# Patient Record
Sex: MALE | Race: WHITE | Employment: OTHER | URBAN - METROPOLITAN AREA
[De-identification: names, ages, dates, MRNs, and addresses within clinical notes are randomized per-mention and may not be internally consistent; named-entity substitution may affect disease eponyms.]

---

## 2017-05-15 ENCOUNTER — OFFICE VISIT (OUTPATIENT)
Dept: CARDIOLOGY CLINIC | Age: 77
End: 2017-05-15

## 2017-05-15 VITALS
SYSTOLIC BLOOD PRESSURE: 130 MMHG | HEART RATE: 52 BPM | BODY MASS INDEX: 26.07 KG/M2 | DIASTOLIC BLOOD PRESSURE: 60 MMHG | OXYGEN SATURATION: 98 % | HEIGHT: 68 IN | WEIGHT: 172 LBS

## 2017-05-15 DIAGNOSIS — I25.10 ATHEROSCLEROSIS OF NATIVE CORONARY ARTERY OF NATIVE HEART WITHOUT ANGINA PECTORIS: Primary | ICD-10-CM

## 2017-05-15 DIAGNOSIS — I11.9 BENIGN HYPERTENSIVE HEART DISEASE WITHOUT HEART FAILURE: ICD-10-CM

## 2017-05-15 DIAGNOSIS — I44.7 LBBB (LEFT BUNDLE BRANCH BLOCK): ICD-10-CM

## 2017-05-15 DIAGNOSIS — E78.5 DYSLIPIDEMIA: ICD-10-CM

## 2017-05-15 DIAGNOSIS — R07.9 CHEST PAIN, UNSPECIFIED TYPE: ICD-10-CM

## 2017-05-15 DIAGNOSIS — R00.1 SINUS BRADYCARDIA, CHRONIC: ICD-10-CM

## 2017-05-15 NOTE — MR AVS SNAPSHOT
Visit Information Date & Time Provider Department Dept. Phone Encounter #  
 5/15/2017  3:00 PM Deidra Anderson MD [facility] [address] [id number] Upcoming Health Maintenance Date Due DTaP/Tdap/Td series (1 - Tdap) 12/28/1963 FOBT Q 1 YEAR AGE 50-75 12/28/1992 ZOSTER VACCINE AGE 60> 12/28/2002 GLAUCOMA SCREENING Q2Y 12/28/2007 Pneumococcal 65+ Low/Medium Risk (1 of 2 - PCV13) 12/28/2007 MEDICARE YEARLY EXAM 12/28/2007 INFLUENZA AGE 9 TO ADULT 8/1/2017 Allergies as of 5/15/2017  Review Complete On: 11/14/2016 By: Deidra Anderson MD  
 No Known Allergies Current Immunizations  Never Reviewed No immunizations on file. Not reviewed this visit You Were Diagnosed With   
  
 Codes Comments Atherosclerosis of native coronary artery of native heart without angina pectoris    -  Primary ICD-10-CM: I25.10 ICD-9-CM: 414.01 Sinus bradycardia, chronic (HCC)     ICD-10-CM: R00.1 ICD-9-CM: 427.81 PVC's (premature ventricular contractions)     ICD-10-CM: I49.3 ICD-9-CM: 427.69   
 LBBB (left bundle branch block)     ICD-10-CM: I44.7 ICD-9-CM: 426.3 Benign hypertensive heart disease without heart failure     ICD-10-CM: I11.9 ICD-9-CM: 402.10 Dyslipidemia     ICD-10-CM: E78.5 ICD-9-CM: 272.4 Chest pain, unspecified type     ICD-10-CM: R07.9 ICD-9-CM: 786.50 Vitals BP Pulse Height(growth percentile) Weight(growth percentile) SpO2 BMI  
 130/60 (!) 52 5' 8\" (1.727 m) 172 lb (78 kg) 98% 26.15 kg/m2 Smoking Status Never Smoker Vitals History BMI and BSA Data Body Mass Index Body Surface Area  
 26.15 kg/m 2 1.93 m 2 Preferred Pharmacy Pharmacy Name [address], [facility] [phone] Your Updated Medication List  
  
   
 This list is accurate as of: 5/15/17  3:42 PM.  Always use your most recent med list.  
  
  
  
  
 aspirin 81 mg tablet Take 81 mg by mouth daily. B-100 COMPLEX PO Take  by mouth.  
  
 clonazePAM 1 mg tablet Commonly known as:  [place] Take 0.5 mg by mouth daily. clopidogrel 75 mg Tab Commonly known as:  PLAVIX TAKE 1 TABLET DAILY  
  
 COQ10  PO Take  by mouth daily. lisinopril 5 mg tablet Commonly known as:  PRINIVIL, ZESTRIL  
TAKE 1 TABLET DAILY  
  
 LOVAZA 1 gram capsule Generic drug:  omega-3 acid ethyl esters Take 2 g by mouth two (2) times a day. nitroglycerin 0.4 mg SL tablet Commonly known as:  NITROSTAT  
1 Tab by SubLINGual route every five (5) minutes as needed for Chest Pain.  
  
 traMADol 50 mg tablet Commonly known as:  ULTRAM  
Take 50 mg by mouth every six (6) hours as needed for Pain. ZOCOR 20 mg tablet Generic drug:  simvastatin Take 20 mg by mouth nightly. We Performed the Following AMB POC EKG ROUTINE W/ 12 LEADS, INTER & REP [55754 CPT(R)] To-Do List   
 05/15/2017 ECG:  CARDIAC SPECT REST AND STRESS   
  
 05/15/2017 ECG:  NUCLEAR STRESS TEST   
  
 05/30/2017 8:15 AM  
  Appointment with HBV NUC CARD ROOM at [facility] NON-INVASIVE CARD ([phone]) This is a 2-part test which takes approximately 4 hours to complete. Please see part 2 of exam below for full instructions 05/30/2017 8:45 AM  
  Appointment with HBV NUC CARD ROOM at [facility] NON-INVASIVE CARD ([phone]) 1-No eating or coffee after midnight  2-Do not take diabetic meds (bring with) 3-Please take all other meds unless specified by cardiology [facility]! Dear Eleazar Foss: 
Thank you for requesting a [email or web address] account. Our records indicate that you already have an active [email or web address] account. You can access your account anytime at [email or web address] Did you know that you can access your hospital and ER discharge instructions at any time in [employer]? You can also review all of your test results from your hospital stay or ER visit. Additional Information If you have questions, please visit the Frequently Asked Questions section of the [employer] website at [email or web address] Remember, [employer] is NOT to be used for urgent needs. For medical emergencies, dial 911. Now available from your iPhone and Android! Please provide this summary of care documentation to your next provider. Your primary care clinician is listed as Will Marie. If you have any questions after today's visit, please call [phone].

## 2017-05-15 NOTE — PROGRESS NOTES
HISTORY OF PRESENT ILLNESS  Sumeet Arango is a 78 y.o. male. Hypertension   Associated symptoms include chest pain. Pertinent negatives include no abdominal pain, no headaches and no shortness of breath. Chest Pain    Pertinent negatives include no abdominal pain, no claudication, no cough, no dizziness, no fever, no headaches, no nausea, no orthopnea, no palpitations, no PND, no shortness of breath and no vomiting. Patient presents for a followup office visit. The patient has a past medical history significant for coronary artery disease status post multivessel stenting in 2009. He also has a history of hypertension, dyslipidemia, and a previous CVA. Patient underwent a pharmacologic nuclear stress test in August 2014 which was a low risk study showing no significant ischemia. In 2015, he was having frequent palpitations, so was seen by her nurse practitioner, who arrange for a Holter monitor, which demonstrated frequent PVCs, primarily isolated, but also in trigeminy, which appeared to correlate to the patient's symptoms. However, the vast majority appear to be asymptomatic. The patient was last seen in the office 6 months ago, since last visit, he has noted episodic sharp stabbing left-sided chest pain over the past several weeks. He states the symptoms are short-lived only lasting for a few seconds before subsiding. They usually occur at rest or not particularly worse with exertion. Patient also reports an episode of fairly severe indigestion several weeks ago, which he had not noted in the past.  He did describe a pressure in his chest which was relieved with belching. Past Medical History:   Diagnosis Date    Cardiac catheterization 03/11/2009    LM mild. p/mLAD 70%. mLAD stent patent. oD 85%. CX 30%. mRCA 35%. p/dRCA stents patent. RPLB 30%. Unsuccessful PTCA of diagonal lesion.  Cardiac echocardiogram 03/10/2009    Tech. difficult. EF 50%. Mild apical hypk.   Mod increased ventricular septal wall thickness. Mild LAE. Mild-mod AI.  Cardiac Holter monitor 01/28/2015    Baseline bradycardia w/bundle branch block. Max HR of 91 bpm may indicate chronotropic incompetence. Patient's events mainly corresponded to PVCs w/ventricular trigeminy. PVC burden <1% in 48-hour study.  Cardiac nuclear imaging test 08/04/2014    No ischemia or prior infarction. EF 62%. No RWMA. Nondiagnostic EKG on pharm stress test.  Low risk.  Coronary artery disease     MI and stents placed approx 2012    Dyslipidemia     Hypertension     PACs     Sleep apnea     uses cpap    Stroke [facility])     '04  residual R paraesthesias      Current Outpatient Prescriptions   Medication Sig Dispense Refill    lisinopril (PRINIVIL, ZESTRIL) 5 mg tablet TAKE 1 TABLET DAILY 90 Tab 2    clopidogrel (PLAVIX) 75 mg tab TAKE 1 TABLET DAILY 90 Tab 2    nitroglycerin (NITROSTAT) 0.4 mg SL tablet 1 Tab by SubLINGual route every five (5) minutes as needed for Chest Pain. 25 Tab 3    traMADol (ULTRAM) 50 mg tablet Take 50 mg by mouth every six (6) hours as needed for Pain.  VITAMIN B COMPLEX (B-100 COMPLEX PO) Take  by mouth.  UBIDECARENONE/VITAMIN E MIXED (COQ10  PO) Take  by mouth daily.  simvastatin (ZOCOR) 20 mg tablet Take 20 mg by mouth nightly.  aspirin 81 mg tablet Take 81 mg by mouth daily.  omega-3 acid ethyl esters (LOVAZA) 1 gram capsule Take 2 g by mouth two (2) times a day.  clonazepam (KLONOPIN) 1 mg tablet Take 0.5 mg by mouth daily. No Known Allergies     Social History   Substance Use Topics    Smoking status: Never Smoker    Smokeless tobacco: Never Used    Alcohol use Yes      Comment: beer occasionally      Review of Systems   Constitutional: Negative for chills, fever and weight loss. HENT: Negative for nosebleeds. Eyes: Negative for blurred vision and double vision. Respiratory: Negative for cough, shortness of breath and wheezing. Cardiovascular: Positive for chest pain. Negative for palpitations, orthopnea, claudication, leg swelling and PND. Gastrointestinal: Negative for abdominal pain, heartburn, nausea and vomiting. Genitourinary: Negative for dysuria and hematuria. Musculoskeletal: Negative for falls and myalgias. Skin: Negative for rash. Neurological: Negative for dizziness, focal weakness and headaches. Endo/Heme/Allergies: Does not bruise/bleed easily. Psychiatric/Behavioral: Negative for substance abuse. Visit Vitals    /60    Pulse (!) 52    Ht 5' 8\" (1.727 m)    Wt 78 kg (172 lb)    SpO2 98%    BMI 26.15 kg/m2      Physical Exam   Constitutional: He is oriented to person, place, and time. He appears well-developed and well-nourished. HENT:   Head: Normocephalic and atraumatic. Eyes: Conjunctivae are normal.   Neck: Neck supple. No JVD present. Carotid bruit is not present. Cardiovascular: Regular rhythm, S1 normal, S2 normal and normal pulses. Bradycardia present. Exam reveals no gallop and no S3. No murmur heard. Pulmonary/Chest: Breath sounds normal. He has no wheezes. He has no rales. Abdominal: Soft. Bowel sounds are normal. There is no tenderness. Musculoskeletal: He exhibits no edema. Neurological: He is alert and oriented to person, place, and time. Skin: Skin is warm and dry. EKG: Sinus bradycardia, left bundle branch block, left axis deviation. No change compared to previous EKG    ASSESSMENT and PLAN    Chest pain. Somewhat atypical for angina, however, he did have a new episode of heartburn which was somewhat concerning several weeks ago. I recommended a repeat pharmacologic nuclear stress test since it has been nearly 3 years since his last study. Coronary artery disease. Patient has a history of multivessel stenting in 2009 with a drug-eluting stent to his mid LAD and RPDA. He had a bare-metal stent to his proximal RCA.   He has been maintained on aspirin and Plavix since that time. He also remains on simvastatin. He was unable to tolerate a beta blocker due to to bradycardia. He last underwent day nuclear stress test in August 2014 which did not show any significant ischemia. A repeat stress test will be arranged as described above. Dyslipidemia. Patient is on simvastatin 20 mg daily. This has been followed by his PCP. His goal LDL should be less than 70. Hypertension. Patient's blood pressure is adequately controlled on lisinopril as monotherapy. Left bundle-branch block. This is chronic in nature. Unchanged on today's EKG. Sinus bradycardia. This has been chronic, he remains asymptomatic, so there is no indication for a permanent pacemaker this time. As long as a stress test is unremarkable, the patient can follow-up in 6 months.

## 2017-05-15 NOTE — PROGRESS NOTES
1. Have you been to the ER, urgent care clinic since your last visit? Hospitalized since your last visit? No     2. Have you seen or consulted any other health care providers outside of the [address] [place] [address] since your last visit? Include any pap smears or colon screening.  No

## 2017-05-30 ENCOUNTER — HOSPITAL ENCOUNTER (OUTPATIENT)
Dept: NON INVASIVE DIAGNOSTICS | Age: 77
Discharge: HOME | End: 2017-05-30
Attending: INTERNAL MEDICINE
Payer: MEDICARE

## 2017-05-30 DIAGNOSIS — R07.9 CHEST PAIN, UNSPECIFIED TYPE: ICD-10-CM

## 2017-05-30 DIAGNOSIS — I25.10 ATHEROSCLEROSIS OF NATIVE CORONARY ARTERY OF NATIVE HEART WITHOUT ANGINA PECTORIS: ICD-10-CM

## 2017-05-30 LAB
ATTENDING PHYSICIAN, CST07: NORMAL
DIAGNOSIS, 93000: NORMAL
DUKE TM SCORE RESULT, CST14: NORMAL
DUKE TREADMILL SCORE, CST13: NORMAL
ECG INTERP BEFORE EX, CST11: NORMAL
ECG INTERP DURING EX, CST12: NORMAL
FUNCTIONAL CAPACITY, CST17: NORMAL
KNOWN CARDIAC CONDITION, CST08: NORMAL
MAX. DIASTOLIC BP, CST04: 70 MMHG
MAX. HEART RATE, CST05: 81 BPM
MAX. SYSTOLIC BP, CST03: 162 MMHG
OVERALL BP RESPONSE TO EXERCISE, CST16: NORMAL
OVERALL HR RESPONSE TO EXERCISE, CST15: NORMAL
PEAK EX METS, CST10: 1 METS
PROTOCOL NAME, CST01: NORMAL
TEST INDICATION, CST09: NORMAL

## 2017-05-30 PROCEDURE — 93017 CV STRESS TEST TRACING ONLY: CPT | Performed by: INTERNAL MEDICINE

## 2017-05-30 PROCEDURE — A9500 TC99M SESTAMIBI: HCPCS

## 2017-05-30 PROCEDURE — 78452 HT MUSCLE IMAGE SPECT MULT: CPT | Performed by: INTERNAL MEDICINE

## 2017-05-30 PROCEDURE — 74011250636 HC RX REV CODE- 250/636: Performed by: INTERNAL MEDICINE

## 2017-05-30 RX ORDER — SODIUM CHLORIDE 0.9 % (FLUSH) 0.9 %
10 SYRINGE (ML) INJECTION AS NEEDED
Status: COMPLETED | OUTPATIENT
Start: 2017-05-30 | End: 2017-05-30

## 2017-05-30 RX ADMIN — Medication 10 ML: at 08:20

## 2017-05-30 RX ADMIN — Medication 10 ML: at 09:20

## 2017-05-30 RX ADMIN — REGADENOSON 0.4 MG: 0.08 INJECTION, SOLUTION INTRAVENOUS at 09:20

## 2017-05-30 NOTE — PROCEDURES
[address] STRESS    Name:  Army Sales  MR#:  [id number]  :  1940  Account #:  [de-identified]  Date of Adm:  2017  Date of Service:      PROCEDURES PERFORMED: Pharmacologic nuclear scan. Baseline electrocardiogram shows sinus bradycardia with left bundle  branch block pattern. The patient has an IV in the left arm. He received Lexiscan per  protocol. He tolerated the procedure well. No chest pain was noted. He  received resting dose of sestamibi 10.3 mCi at 8:20 a.m. He received  stress dose of sestamibi 33.2 mCi at 9:20 a.m. TREADMILL FINDINGS:  1. ST segment changes: None. 2. Chest pain: None. 3. Dysrhythmia: None. 4. Both heart rate and blood pressure response are normal.    TREADMILL CONCLUSION: This is a nondiagnostic pharmacologic  stress test from an electrocardiographic standpoint due to underlying  baseline left bundle branch block pattern. MYOCARDIAL NUCLEAR PERFUSION STUDY FINDINGS:  1. Perfusion imaging shows inferior wall thinning, which is noted during  both rest and stress; it is more noticeable at rest. This is likely  consistent with diaphragmatic attenuation. 2. Gated SPECT imaging shows mildly dilated left ventricle with  normal left ventricular function with estimated ejection fraction of 57%. No obvious regional wall motion abnormalities noted. CONCLUSIONS:  1. Nondiagnostic pharmacologic stress test from an  electrocardiographic standpoint due to underlying left bundle branch  block pattern. 2. Likely normal perfusion study. 3. Perfusion imaging shows no evidence of significant ongoing  ischemia or prior infarction. As noted above, there is inferior wall  thinning more noticeable at rest than during stress, likely consistent  with diaphragmatic attenuation.   4. Gated SPECT imaging shows upper normal to mildly dilated left  ventricle with normal left ventricular function with estimated ejection  fraction of 57%. No obvious regional wall motion abnormalities noted. 5. In comparison to prior study of 08/2014, no significant changes are  noted. 6. This is a low risk finding. MD Cathi Lovelace / Audi Miller  D:  05/30/2017   12:16  T:  05/30/2017   14:45  Job #:  [id number]    Dr. Vincente Phoenix.

## 2017-05-30 NOTE — PROGRESS NOTES
Patient was injected with 56.3 millicuries 13RWH Sestamibi on 5/30/17 at 0820. Patient was injected with 00.9 millicuries 72KNB Sestamibi on  5/30/17 at 0920. Patient's armbands were removed and placed in shred-it box.     Patient had a Nuclear Lexiscan Stress Test.

## 2017-05-31 NOTE — PROGRESS NOTES
Per your last note \" Chest pain. Somewhat atypical for angina, however, he did have a new episode of heartburn which was somewhat concerning several weeks ago. I recommended a repeat pharmacologic nuclear stress test since it has been nearly 3 years since his last study.

## 2017-06-01 ENCOUNTER — TELEPHONE (OUTPATIENT)
Dept: CARDIOLOGY CLINIC | Age: 77
End: 2017-06-01

## 2017-06-01 NOTE — LETTER
8/8/2017 9:03 AM 
 
Mr. Henry Donaldson [address] [address] [address] Dear Ina Edwige Salas, We have been unable to reach you by phone to notify you of your test results. Please call our office at [phone] and ask to speak with my nurse in order to explain these results to you and advise you of any recommendations. Sincerely, MD Erma Bee MD

## 2017-06-01 NOTE — TELEPHONE ENCOUNTER
----- Message from Lesvia Bonilla MD sent at 6/1/2017  8:42 AM EDT -----  Please let the patient know that her stress test was normal  ----- Message -----     From: Radha May RN     Sent: 5/31/2017  12:55 PM       To: Lesvia Bonilla MD    Per your last note \" Chest pain. Somewhat atypical for angina, however, he did have a new episode of heartburn which was somewhat concerning several weeks ago. I recommended a repeat pharmacologic nuclear stress test since it has been nearly 3 years since his last study.

## 2017-06-23 ENCOUNTER — HOSPITAL ENCOUNTER (OUTPATIENT)
Dept: MRI IMAGING | Age: 77
Discharge: HOME | End: 2017-06-23
Attending: FAMILY MEDICINE
Payer: MEDICARE

## 2017-06-23 DIAGNOSIS — R20.2 PARESTHESIA: ICD-10-CM

## 2017-06-23 LAB — CREAT UR-MCNC: 1.1 MG/DL (ref 0.6–1.3)

## 2017-06-23 PROCEDURE — A9585 GADOBUTROL INJECTION: HCPCS

## 2017-06-23 PROCEDURE — 82565 ASSAY OF CREATININE: CPT

## 2017-06-23 PROCEDURE — 74011250636 HC RX REV CODE- 250/636

## 2017-06-23 PROCEDURE — 70553 MRI BRAIN STEM W/O & W/DYE: CPT

## 2017-06-23 RX ADMIN — GADOBUTROL 7.5 ML: 604.72 INJECTION INTRAVENOUS at 16:46

## 2017-07-05 ENCOUNTER — HOSPITAL ENCOUNTER (OUTPATIENT)
Dept: CT IMAGING | Age: 77
Discharge: HOME | End: 2017-07-05
Attending: FAMILY MEDICINE
Payer: MEDICARE

## 2017-07-05 DIAGNOSIS — D49.2 NEOPLASM OF BONE OF SKULL: ICD-10-CM

## 2017-07-05 PROCEDURE — 70460 CT HEAD/BRAIN W/DYE: CPT

## 2017-07-05 PROCEDURE — 74011636320 HC RX REV CODE- 636/320

## 2017-07-05 PROCEDURE — 71260 CT THORAX DX C+: CPT

## 2017-07-05 RX ADMIN — IOPAMIDOL 80 ML: 612 INJECTION, SOLUTION INTRAVENOUS at 11:00

## 2017-08-14 ENCOUNTER — TELEPHONE (OUTPATIENT)
Dept: CARDIOLOGY CLINIC | Age: 77
End: 2017-08-14

## 2017-09-07 RX ORDER — LISINOPRIL 5 MG/1
TABLET ORAL
Qty: 90 TAB | Refills: 3 | Status: SHIPPED | OUTPATIENT
Start: 2017-09-07 | End: 2018-01-01 | Stop reason: SDUPTHER

## 2017-09-07 RX ORDER — CLOPIDOGREL BISULFATE 75 MG/1
75 TABLET ORAL DAILY
Qty: 90 TAB | Refills: 3 | Status: SHIPPED | OUTPATIENT
Start: 2017-09-07 | End: 2018-01-01 | Stop reason: SDUPTHER

## 2017-10-23 ENCOUNTER — TELEPHONE (OUTPATIENT)
Dept: CARDIOLOGY CLINIC | Age: 77
End: 2017-10-23

## 2017-10-23 NOTE — TELEPHONE ENCOUNTER
Dr Elsa Glass DDS request patient to stop Plavix and ASA for 5 days prior to procedure. Dr Lomax  informed, reviewed patient's chart, approved stopping plavix and ASA for 5 days. Letter typed and faxed.

## 2017-10-23 NOTE — LETTER
10/23/2017 3:36 PM 
 
Mr. Miranda Landaverde [address] [address] [address] Margret Dyer was seen in our office on 05/15/2017 for cardiac evaluation. From a cardiac standpoint he can stop Plavix and Aspirin 5 days prior to oral surgery with Dr Manny Austin. Please feel free to contact our office if you have any questions regarding this patient. Sincerely, Linda Carrel, MD

## 2017-11-06 ENCOUNTER — OFFICE VISIT (OUTPATIENT)
Dept: CARDIOLOGY CLINIC | Age: 77
End: 2017-11-06

## 2017-11-06 VITALS
DIASTOLIC BLOOD PRESSURE: 68 MMHG | HEART RATE: 57 BPM | OXYGEN SATURATION: 96 % | BODY MASS INDEX: 26.52 KG/M2 | WEIGHT: 175 LBS | SYSTOLIC BLOOD PRESSURE: 146 MMHG | HEIGHT: 68 IN

## 2017-11-06 DIAGNOSIS — I25.10 ATHEROSCLEROSIS OF NATIVE CORONARY ARTERY OF NATIVE HEART WITHOUT ANGINA PECTORIS: Primary | ICD-10-CM

## 2017-11-06 DIAGNOSIS — I44.7 LBBB (LEFT BUNDLE BRANCH BLOCK): ICD-10-CM

## 2017-11-06 DIAGNOSIS — R00.1 SINUS BRADYCARDIA, CHRONIC: ICD-10-CM

## 2017-11-06 DIAGNOSIS — I11.9 BENIGN HYPERTENSIVE HEART DISEASE WITHOUT HEART FAILURE: ICD-10-CM

## 2017-11-06 DIAGNOSIS — E78.5 DYSLIPIDEMIA: ICD-10-CM

## 2017-11-06 NOTE — PROGRESS NOTES
1. Have you been to the ER, urgent care clinic since your last visit? Hospitalized since your last visit? No     2. Have you seen or consulted any other health care providers outside of the [address] [place] [address] since your last visit? Include any pap smears or colon screening.  No

## 2017-11-06 NOTE — PROGRESS NOTES
HISTORY OF PRESENT ILLNESS  Keli Lazar is a 78 y.o. male. Dizziness   Pertinent negatives include no chest pain, no abdominal pain, no headaches and no shortness of breath. Hypertension   Pertinent negatives include no chest pain, no abdominal pain, no headaches and no shortness of breath. Patient presents for a followup office visit. The patient has a past medical history significant for coronary artery disease status post multivessel stenting in 2009. He also has a history of hypertension, dyslipidemia, and a previous CVA. Patient underwent a pharmacologic nuclear stress test in May 2017 which was a low risk study showing no significant ischemia. EF 57%. The patient was last seen in the office 6 months ago, since last visit, he reports he has been feeling well. No recurrent chest pain or pressure. No heartburn symptoms. No shortness of breath at rest or with exertion. Orthopnea, PND or leg swelling. No claudication. He did have a severe dizzy episode in June of this year which likely turned out to be vertigo. He states his symptoms gradually improved. He underwent extensive neurological testing including MRIs, CT scans and ENT testing. Overall, he states his activity tolerance has not changed since last visit. Past Medical History:   Diagnosis Date    Cardiac catheterization 03/11/2009    LM mild. p/mLAD 70%. mLAD stent patent. oD 85%. CX 30%. mRCA 35%. p/dRCA stents patent. RPLB 30%. Unsuccessful PTCA of diagonal lesion.  Cardiac echocardiogram 03/10/2009    Tech. difficult. EF 50%. Mild apical hypk. Mod increased ventricular septal wall thickness. Mild LAE. Mild-mod AI.  Cardiac Holter monitor 01/28/2015    Baseline bradycardia w/bundle branch block. Max HR of 91 bpm may indicate chronotropic incompetence. Patient's events mainly corresponded to PVCs w/ventricular trigeminy. PVC burden <1% in 48-hour study.     Cardiac nuclear imaging test 08/04/2014 No ischemia or prior infarction. EF 62%. No RWMA. Nondiagnostic EKG on pharm stress test.  Low risk.  Coronary artery disease     MI and stents placed approx 2012    Dyslipidemia     Hypertension     PACs     Sleep apnea     uses cpap    Stroke [facility])     '04  residual R paraesthesias      Current Outpatient Prescriptions   Medication Sig Dispense Refill    lisinopril (PRINIVIL, ZESTRIL) 5 mg tablet TAKE 1 TABLET DAILY 90 Tab 3    clopidogrel (PLAVIX) 75 mg tab Take 1 Tab by mouth daily. 90 Tab 3    nitroglycerin (NITROSTAT) 0.4 mg SL tablet 1 Tab by SubLINGual route every five (5) minutes as needed for Chest Pain. 25 Tab 3    traMADol (ULTRAM) 50 mg tablet Take 50 mg by mouth every six (6) hours as needed for Pain.  VITAMIN B COMPLEX (B-100 COMPLEX PO) Take  by mouth.  UBIDECARENONE/VITAMIN E MIXED (COQ10  PO) Take  by mouth daily.  simvastatin (ZOCOR) 20 mg tablet Take 20 mg by mouth nightly.  aspirin 81 mg tablet Take 81 mg by mouth daily.  omega-3 acid ethyl esters (LOVAZA) 1 gram capsule Take 2 g by mouth two (2) times a day.  clonazepam (KLONOPIN) 1 mg tablet Take 0.5 mg by mouth daily. No Known Allergies     Social History   Substance Use Topics    Smoking status: Never Smoker    Smokeless tobacco: Never Used    Alcohol use Yes      Comment: beer occasionally      Review of Systems   Constitutional: Negative for chills, fever and weight loss. HENT: Negative for nosebleeds. Eyes: Negative for blurred vision and double vision. Respiratory: Negative for cough, shortness of breath and wheezing. Cardiovascular: Negative for chest pain, palpitations, orthopnea, claudication, leg swelling and PND. Gastrointestinal: Negative for abdominal pain, heartburn, nausea and vomiting. Genitourinary: Negative for dysuria and hematuria. Musculoskeletal: Negative for falls and myalgias. Skin: Negative for rash.    Neurological: Negative for dizziness, focal weakness and headaches. Endo/Heme/Allergies: Does not bruise/bleed easily. Psychiatric/Behavioral: Negative for substance abuse. Visit Vitals    /68    Pulse (!) 57    Ht 5' 8\" (1.727 m)    Wt 79.4 kg (175 lb)    SpO2 96%    BMI 26.61 kg/m2      Physical Exam   Constitutional: He is oriented to person, place, and time. He appears well-developed and well-nourished. HENT:   Head: Normocephalic and atraumatic. Eyes: Conjunctivae are normal.   Neck: Neck supple. No JVD present. Carotid bruit is not present. Cardiovascular: Regular rhythm, S1 normal, S2 normal and normal pulses. Bradycardia present. Exam reveals no gallop and no S3. No murmur heard. Pulmonary/Chest: Breath sounds normal. He has no wheezes. He has no rales. Abdominal: Soft. Bowel sounds are normal. There is no tenderness. Musculoskeletal: He exhibits no edema. Neurological: He is alert and oriented to person, place, and time. Skin: Skin is warm and dry. EKG: Sinus bradycardia, left bundle branch block, left axis deviation. No change compared to previous EKG    ASSESSMENT and PLAN    Coronary artery disease. Patient has a history of multivessel stenting in 2009 with a drug-eluting stent to his mid LAD and RPDA. He had a bare-metal stent to his proximal RCA. He has been maintained on aspirin and Plavix since that time. He also remains on simvastatin. He was unable to tolerate a beta blocker due to to bradycardia. He last underwent a pharmacological nuclear stress test in May 2017 which did not show any significant ischemia. I will continue his current medical regimen. Dyslipidemia. Patient is on simvastatin 20 mg daily. This has been followed by his PCP. His goal LDL should be less than 70. Hypertension. Patient's blood pressure is historically well controlled on lisinopril as monotherapy. Left bundle-branch block. This is chronic in nature. Unchanged on today's EKG. Sinus bradycardia. This has been chronic, he remains asymptomatic, so there is no indication for a permanent pacemaker this time. Follow-up in 6 months, sooner if needed.

## 2017-11-06 NOTE — MR AVS SNAPSHOT
Visit Information Date & Time Provider Department Dept. Phone Encounter #  
 11/6/2017  8:00 AM Kodak Pennington MD [facility] [address] [id number] Your Appointments 5/7/2018  2:40 PM  
Follow Up with Kodak Pennington MD  
[facility] ([address]) Appt Note: 6 month f/up Mirna [address] [address] [address] [phone] [address] [place] [address] [address] Upcoming Health Maintenance Date Due DTaP/Tdap/Td series (1 - Tdap) 12/28/1963 FOBT Q 1 YEAR AGE 50-75 12/28/1992 ZOSTER VACCINE AGE 60> 10/28/2002 GLAUCOMA SCREENING Q2Y 12/28/2007 Pneumococcal 65+ Low/Medium Risk (1 of 2 - PCV13) 12/28/2007 MEDICARE YEARLY EXAM 12/28/2007 INFLUENZA AGE 9 TO ADULT 8/1/2017 Allergies as of 11/6/2017  Review Complete On: 7/5/2017 By: Shawn Moss No Known Allergies Current Immunizations  Never Reviewed No immunizations on file. Not reviewed this visit You Were Diagnosed With   
  
 Codes Comments Atherosclerosis of native coronary artery of native heart without angina pectoris    -  Primary ICD-10-CM: I25.10 ICD-9-CM: 414.01 Sinus bradycardia, chronic     ICD-10-CM: R00.1 ICD-9-CM: 427.81   
 LBBB (left bundle branch block)     ICD-10-CM: I44.7 ICD-9-CM: 426.3 PVC's (premature ventricular contractions)     ICD-10-CM: I49.3 ICD-9-CM: 427.69 Dyslipidemia     ICD-10-CM: E78.5 ICD-9-CM: 272.4 Benign hypertensive heart disease without heart failure     ICD-10-CM: I11.9 ICD-9-CM: 402.10 Vitals BP Pulse Height(growth percentile) Weight(growth percentile) SpO2 BMI  
 146/68 (!) 57 5' 8\" (1.727 m) 175 lb (79.4 kg) 96% 26.61 kg/m2 Smoking Status Never Smoker Vitals History BMI and BSA Data  Body Mass Index Body Surface Area  
 26.61 kg/m 2 1.95 m 2  
  
  
 Preferred Pharmacy Pharmacy Name Phone [address], [facility] [phone] Your Updated Medication List  
  
   
This list is accurate as of: 11/6/17  8:39 AM.  Always use your most recent med list.  
  
  
  
  
 aspirin 81 mg tablet Take 81 mg by mouth daily. B-100 COMPLEX PO Take  by mouth.  
  
 clonazePAM 1 mg tablet Commonly known as:  Rhetta Jose Take 0.5 mg by mouth daily. clopidogrel 75 mg Tab Commonly known as:  PLAVIX Take 1 Tab by mouth daily. COQ10  PO Take  by mouth daily. lisinopril 5 mg tablet Commonly known as:  PRINIVIL, ZESTRIL  
TAKE 1 TABLET DAILY  
  
 LOVAZA 1 gram capsule Generic drug:  omega-3 acid ethyl esters Take 2 g by mouth two (2) times a day. nitroglycerin 0.4 mg SL tablet Commonly known as:  NITROSTAT  
1 Tab by SubLINGual route every five (5) minutes as needed for Chest Pain.  
  
 traMADol 50 mg tablet Commonly known as:  ULTRAM  
Take 50 mg by mouth every six (6) hours as needed for Pain. ZOCOR 20 mg tablet Generic drug:  simvastatin Take 20 mg by mouth nightly. We Performed the Following AMB POC EKG ROUTINE W/ 12 LEADS, INTER & REP [28441 CPT(R)] To-Do List   
 11/20/2017 8:00 AM  
  Appointment with [facility] CT RM 1 at [facility] RAD CT ([phone]) GENERAL INSTRUCTIONS  If you were given medications to take for a contrast allergy prior to having this study, please arrange to have someone drive you to your appointment. If you have had a creatinine level drawn within the past 30 days, please bring most recent results to your appt. This study does not require you to drink contrast prior to your study. MEDICATIONS  Bring a complete list of all medications you are currently taking to include prescriptions, over-the-counter meds, herbals, vitamins & any dietary supplements.   STUDY DETAILS Patient must be NPO (nothing to eat/drink, including meds and water) for 4 hours prior to study. OUTSIDE FILMS  Bring outside films, CDs, and reports related to the study with you on the day of your exam.  QUESTIONS  Notify the CT Department if you have any questions concerning your study. [facility] - [phone] [facility] - [phone] Introducing [facility] & [facility] SERVICES! Dear Villa Perez: 
Thank you for requesting a [employer] account. Our records indicate that you already have an active [employer] account. You can access your account anytime at [email or web address] Did you know that you can access your hospital and ER discharge instructions at any time in [employer]? You can also review all of your test results from your hospital stay or ER visit. Additional Information If you have questions, please visit the Frequently Asked Questions section of the [employer] website at [email or web address] Remember, [employer] is NOT to be used for urgent needs. For medical emergencies, dial 911. Now available from your iPhone and Android! Please provide this summary of care documentation to your next provider. Your primary care clinician is listed as Carmelo Jung. If you have any questions after today's visit, please call [phone].

## 2017-11-20 ENCOUNTER — HOSPITAL ENCOUNTER (OUTPATIENT)
Dept: CT IMAGING | Age: 77
Discharge: HOME | End: 2017-11-20
Attending: FAMILY MEDICINE
Payer: MEDICARE

## 2017-11-20 DIAGNOSIS — R91.8 MULTIPLE LUNG NODULES: ICD-10-CM

## 2017-11-20 LAB — CREAT UR-MCNC: 1.1 MG/DL (ref 0.6–1.3)

## 2017-11-20 PROCEDURE — 71260 CT THORAX DX C+: CPT

## 2017-11-20 PROCEDURE — 74011636320 HC RX REV CODE- 636/320

## 2017-11-20 PROCEDURE — 82565 ASSAY OF CREATININE: CPT

## 2017-11-20 RX ADMIN — IOPAMIDOL 75 ML: 612 INJECTION, SOLUTION INTRAVENOUS at 18:00

## 2017-12-05 RX ORDER — NITROGLYCERIN 0.4 MG/1
0.4 TABLET SUBLINGUAL
Qty: 25 TAB | Refills: 3 | Status: SHIPPED | OUTPATIENT
Start: 2017-12-05 | End: 2018-01-01 | Stop reason: SDUPTHER

## 2018-01-01 ENCOUNTER — OFFICE VISIT (OUTPATIENT)
Dept: CARDIOLOGY CLINIC | Age: 78
End: 2018-01-01

## 2018-01-01 VITALS
HEART RATE: 60 BPM | SYSTOLIC BLOOD PRESSURE: 132 MMHG | WEIGHT: 172 LBS | BODY MASS INDEX: 26.07 KG/M2 | OXYGEN SATURATION: 97 % | DIASTOLIC BLOOD PRESSURE: 66 MMHG | HEIGHT: 68 IN

## 2018-01-01 VITALS
HEIGHT: 68 IN | HEART RATE: 51 BPM | DIASTOLIC BLOOD PRESSURE: 60 MMHG | WEIGHT: 174 LBS | SYSTOLIC BLOOD PRESSURE: 108 MMHG | OXYGEN SATURATION: 98 % | BODY MASS INDEX: 26.37 KG/M2

## 2018-01-01 DIAGNOSIS — I25.10 ATHEROSCLEROSIS OF NATIVE CORONARY ARTERY OF NATIVE HEART WITHOUT ANGINA PECTORIS: Primary | ICD-10-CM

## 2018-01-01 DIAGNOSIS — I11.9 BENIGN HYPERTENSIVE HEART DISEASE WITHOUT HEART FAILURE: ICD-10-CM

## 2018-01-01 DIAGNOSIS — I44.7 LBBB (LEFT BUNDLE BRANCH BLOCK): ICD-10-CM

## 2018-01-01 DIAGNOSIS — E78.5 DYSLIPIDEMIA: ICD-10-CM

## 2018-01-01 DIAGNOSIS — R00.1 SINUS BRADYCARDIA, CHRONIC: ICD-10-CM

## 2018-01-01 RX ORDER — LISINOPRIL 5 MG/1
TABLET ORAL
Qty: 90 TAB | Refills: 3 | Status: SHIPPED | OUTPATIENT
Start: 2018-01-01

## 2018-01-01 RX ORDER — NITROGLYCERIN 0.4 MG/1
TABLET SUBLINGUAL
Qty: 3 BOTTLE | Refills: 3 | Status: SHIPPED | OUTPATIENT
Start: 2018-01-01

## 2018-01-01 RX ORDER — CLOPIDOGREL BISULFATE 75 MG/1
TABLET ORAL
Qty: 90 TAB | Refills: 3 | Status: SHIPPED | OUTPATIENT
Start: 2018-01-01 | End: 2019-01-01

## 2018-05-07 NOTE — PROGRESS NOTES
1. Have you been to the ER, urgent care clinic since your last visit? Hospitalized since your last visit? No    2. Have you seen or consulted any other health care providers outside of the [address] [place] [address] since your last visit? Include any pap smears or colon screening.  No

## 2018-05-07 NOTE — MR AVS SNAPSHOT
[address] [place] [address] [facility] [address] 
[phone] Patient: Donna Spears. MRN: [id number] Visit Information Date & Time Provider Department Dept. Phone Encounter #  
 2018  2:40 PM Abbey Medrano MD [facility] [phone] Upcoming Health Maintenance Date Due DTaP/Tdap/Td series (1 - Tdap) 1963 ZOSTER VACCINE AGE 60> 10/28/2002 GLAUCOMA SCREENING Q2Y 2007 Pneumococcal 65+ Low/Medium Risk (1 of 2 - PCV13) 2007 MEDICARE YEARLY EXAM 3/14/2018 Influenza Age 5 to Adult 2018 Allergies as of 2018  Review Complete On: 2017 By: Abbey Medrano MD  
 No Known Allergies Current Immunizations  Never Reviewed No immunizations on file. Not reviewed this visit You Were Diagnosed With   
  
 Codes Comments Atherosclerosis of native coronary artery of native heart without angina pectoris    -  Primary ICD-10-CM: I25.10 ICD-9-CM: 414.01 Sinus bradycardia, chronic     ICD-10-CM: R00.1 ICD-9-CM: 427.81 PVC's (premature ventricular contractions)     ICD-10-CM: I49.3 ICD-9-CM: 427.69   
 LBBB (left bundle branch block)     ICD-10-CM: I44.7 ICD-9-CM: 426.3 Benign hypertensive heart disease without heart failure     ICD-10-CM: I11.9 ICD-9-CM: 402.10 Dyslipidemia     ICD-10-CM: E78.5 ICD-9-CM: 272.4 Vitals BP Pulse Height(growth percentile) Weight(growth percentile) SpO2 BMI  
 108/60 (!) 51 5' 8\" (1.727 m) 174 lb (78.9 kg) 98% 26.46 kg/m2 Smoking Status Never Smoker Vitals History BMI and BSA Data Body Mass Index Body Surface Area  
 26.46 kg/m 2 1.95 m 2 Preferred Pharmacy Pharmacy Name Phone Vanesa Salas, [facility] [phone] Your Updated Medication List  
  
   
 This list is accurate as of 5/7/18  3:37 PM.  Always use your most recent med list.  
  
  
  
  
 aspirin 81 mg tablet Take 81 mg by mouth daily. B-100 COMPLEX PO Take  by mouth.  
  
 clonazePAM 1 mg tablet Commonly known as:  Cherylyn Rafter Take 0.5 mg by mouth daily. clopidogrel 75 mg Tab Commonly known as:  PLAVIX Take 1 Tab by mouth daily. COQ10  PO Take  by mouth daily. lisinopril 5 mg tablet Commonly known as:  PRINIVIL, ZESTRIL  
TAKE 1 TABLET DAILY  
  
 LOVAZA 1 gram capsule Generic drug:  omega-3 acid ethyl esters Take 2 g by mouth two (2) times a day. nitroglycerin 0.4 mg SL tablet Commonly known as:  NITROSTAT  
1 Tab by SubLINGual route every five (5) minutes as needed for Chest Pain.  
  
 traMADol 50 mg tablet Commonly known as:  ULTRAM  
Take 50 mg by mouth every six (6) hours as needed for Pain. ZOCOR 20 mg tablet Generic drug:  simvastatin Take 20 mg by mouth nightly. We Performed the Following AMB POC EKG ROUTINE W/ 12 LEADS, INTER & REP [68634 CPT(R)] Introducing hospitals & HEALTH SERVICES! Dear Bindu Mcneil: 
Thank you for requesting a [employer] account. Our records indicate that you already have an active [employer] account. You can access your account anytime at [email or web address] Did you know that you can access your hospital and ER discharge instructions at any time in [employer]? You can also review all of your test results from your hospital stay or ER visit. Additional Information If you have questions, please visit the Frequently Asked Questions section of the [employer] website at [email or web address] Remember, [employer] is NOT to be used for urgent needs. For medical emergencies, dial 911. Now available from your iPhone and Android! Please provide this summary of care documentation to your next provider. Your primary care clinician is listed as Trisha Edwin. If you have any questions after today's visit, please call [phone].

## 2018-05-07 NOTE — PROGRESS NOTES
HISTORY OF PRESENT ILLNESS  Mirian Marrero is a 78 y.o. male. Slow Heart Rate   Pertinent negatives include no chest pain, no abdominal pain, no headaches and no shortness of breath. Patient presents for a followup office visit. The patient has a past medical history significant for coronary artery disease status post multivessel stenting in 2009. He also has a history of hypertension, dyslipidemia, and a previous CVA. Patient underwent a pharmacologic nuclear stress test in May 2017 which was a low risk study showing no significant ischemia. EF 57%. The patient was last seen in the office 6 months ago, since last visit, he reports he has been feeling well. He feels his activity level is unchanged from last visit. No new chest pain or shortness of breath. No fatigue, dizziness, syncope or near syncope. No new palpitations. Past Medical History:   Diagnosis Date    Cardiac catheterization 03/11/2009    LM mild. p/mLAD 70%. mLAD stent patent. oD 85%. CX 30%. mRCA 35%. p/dRCA stents patent. RPLB 30%. Unsuccessful PTCA of diagonal lesion.  Cardiac echocardiogram 03/10/2009    Tech. difficult. EF 50%. Mild apical hypk. Mod increased ventricular septal wall thickness. Mild LAE. Mild-mod AI.  Cardiac Holter monitor 01/28/2015    Baseline bradycardia w/bundle branch block. Max HR of 91 bpm may indicate chronotropic incompetence. Patient's events mainly corresponded to PVCs w/ventricular trigeminy. PVC burden <1% in 48-hour study.  Cardiac nuclear imaging test 08/04/2014    No ischemia or prior infarction. EF 62%. No RWMA. Nondiagnostic EKG on pharm stress test.  Low risk.     Coronary artery disease     MI and stents placed approx 2012    Dyslipidemia     Hypertension     PACs     Sleep apnea     uses cpap    Stroke [facility])     '04  residual R paraesthesias      Current Outpatient Prescriptions   Medication Sig Dispense Refill    nitroglycerin (NITROSTAT) 0.4 mg SL tablet 1 Tab by SubLINGual route every five (5) minutes as needed for Chest Pain. 25 Tab 3    lisinopril (PRINIVIL, ZESTRIL) 5 mg tablet TAKE 1 TABLET DAILY 90 Tab 3    clopidogrel (PLAVIX) 75 mg tab Take 1 Tab by mouth daily. 90 Tab 3    traMADol (ULTRAM) 50 mg tablet Take 50 mg by mouth every six (6) hours as needed for Pain.  VITAMIN B COMPLEX (B-100 COMPLEX PO) Take  by mouth.  UBIDECARENONE/VITAMIN E MIXED (COQ10  PO) Take  by mouth daily.  simvastatin (ZOCOR) 20 mg tablet Take 20 mg by mouth nightly.  aspirin 81 mg tablet Take 81 mg by mouth daily.  omega-3 acid ethyl esters (LOVAZA) 1 gram capsule Take 2 g by mouth two (2) times a day.  clonazepam (KLONOPIN) 1 mg tablet Take 0.5 mg by mouth daily. No Known Allergies     Social History   Substance Use Topics    Smoking status: Never Smoker    Smokeless tobacco: Never Used    Alcohol use Yes      Comment: beer occasionally      Review of Systems   Constitutional: Negative for chills, fever and weight loss. HENT: Negative for nosebleeds. Eyes: Negative for blurred vision and double vision. Respiratory: Negative for cough, shortness of breath and wheezing. Cardiovascular: Negative for chest pain, palpitations, orthopnea, claudication, leg swelling and PND. Gastrointestinal: Negative for abdominal pain, heartburn, nausea and vomiting. Genitourinary: Negative for dysuria and hematuria. Musculoskeletal: Negative for falls and myalgias. Skin: Negative for rash. Neurological: Negative for dizziness, focal weakness and headaches. Endo/Heme/Allergies: Does not bruise/bleed easily. Psychiatric/Behavioral: Negative for substance abuse. Visit Vitals    /60    Pulse (!) 51    Ht 5' 8\" (1.727 m)    Wt 78.9 kg (174 lb)    SpO2 98%    BMI 26.46 kg/m2      Physical Exam   Constitutional: He is oriented to person, place, and time. He appears well-developed and well-nourished.    HENT:   Head: Normocephalic and atraumatic. Eyes: Conjunctivae are normal.   Neck: Neck supple. No JVD present. Carotid bruit is not present. Cardiovascular: Regular rhythm, S1 normal, S2 normal and normal pulses. Bradycardia present. Exam reveals no gallop and no S3. No murmur heard. Pulmonary/Chest: Breath sounds normal. He has no wheezes. He has no rales. Abdominal: Soft. Bowel sounds are normal. There is no tenderness. Musculoskeletal: He exhibits no edema. Neurological: He is alert and oriented to person, place, and time. Skin: Skin is warm and dry. EKG: Sinus bradycardia, left bundle branch block, left axis deviation. No change compared to previous EKG    ASSESSMENT and PLAN    Coronary artery disease. Patient has a history of multivessel stenting in 2009 with a drug-eluting stent to his mid LAD and RPDA. He had a bare-metal stent to his proximal RCA. He has been maintained on aspirin and Plavix since that time. He also remains on simvastatin. He was unable to tolerate a beta blocker due to to bradycardia. He last underwent a pharmacological nuclear stress test in May 2017 which did not show any significant ischemia. No new symptoms concerning for angina. Dyslipidemia. Patient is on simvastatin 20 mg daily. This has been followed by his PCP. His goal LDL should be less than 70. Hypertension. Patient's blood pressure is historically well controlled on lisinopril as monotherapy. Left bundle-branch block. This is chronic in nature. Unchanged on today's EKG. Sinus bradycardia. He remains asymptomatic. I would avoid any rate slowing agents. Follow-up in 6 months, sooner if needed.

## 2018-11-12 NOTE — PROGRESS NOTES
HISTORY OF PRESENT ILLNESS  Keisha Harper. is a 78 y.o. male. Follow-up   Pertinent negatives include no chest pain, no abdominal pain, no headaches and no shortness of breath. Patient presents for a followup office visit. The patient has a past medical history significant for coronary artery disease status post multivessel stenting in 2009. He also has a history of hypertension, dyslipidemia, and a previous CVA. Patient underwent a pharmacologic nuclear stress test in May 2017 which was a low risk study showing no significant ischemia. EF 57%. The patient was last seen in the office 6 months ago, since last visit, he reports he has been feeling well. The patient stays active and has not had any major change in his activity level though he does not exercise on a regular basis. He denies any new chest pain or pressure at rest or with exertion, no shortness of breath, leg swelling, orthopnea or PND. No palpitations, dizziness or sharron syncope. Past Medical History:   Diagnosis Date    Cardiac catheterization 03/11/2009    LM mild. p/mLAD 70%. mLAD stent patent. oD 85%. CX 30%. mRCA 35%. p/dRCA stents patent. RPLB 30%. Unsuccessful PTCA of diagonal lesion.  Cardiac echocardiogram 03/10/2009    Tech. difficult. EF 50%. Mild apical hypk. Mod increased ventricular septal wall thickness. Mild LAE. Mild-mod AI.  Cardiac Holter monitor 01/28/2015    Baseline bradycardia w/bundle branch block. Max HR of 91 bpm may indicate chronotropic incompetence. Patient's events mainly corresponded to PVCs w/ventricular trigeminy. PVC burden <1% in 48-hour study.  Cardiac nuclear imaging test 08/04/2014    No ischemia or prior infarction. EF 62%. No RWMA. Nondiagnostic EKG on pharm stress test.  Low risk.     Coronary artery disease     MI and stents placed approx 2012    Dyslipidemia     Hypertension     PACs     Sleep apnea     uses cpap    Stroke [facility])     '04  residual R paraesthesias      Current Outpatient Medications   Medication Sig Dispense Refill    TURMERIC PO Take  by mouth.  lisinopril (PRINIVIL, ZESTRIL) 5 mg tablet TAKE 1 TABLET DAILY 90 Tab 3    clopidogrel (PLAVIX) 75 mg tab TAKE 1 TABLET DAILY 90 Tab 3    nitroglycerin (NITROSTAT) 0.4 mg SL tablet 1 Tab by SubLINGual route every five (5) minutes as needed for Chest Pain. 25 Tab 3    traMADol (ULTRAM) 50 mg tablet Take 50 mg by mouth every six (6) hours as needed for Pain.  VITAMIN B COMPLEX (B-100 COMPLEX PO) Take  by mouth.  UBIDECARENONE/VITAMIN E MIXED (COQ10  PO) Take  by mouth daily.  simvastatin (ZOCOR) 20 mg tablet Take 20 mg by mouth nightly.  aspirin 81 mg tablet Take 81 mg by mouth daily.  omega-3 acid ethyl esters (LOVAZA) 1 gram capsule Take 2 g by mouth two (2) times a day.  clonazepam (KLONOPIN) 1 mg tablet Take 0.5 mg by mouth daily. No Known Allergies     Social History     Tobacco Use    Smoking status: Never Smoker    Smokeless tobacco: Never Used   Substance Use Topics    Alcohol use: Yes     Comment: beer occasionally    Drug use: No      Review of Systems   Constitutional: Negative for chills, fever and weight loss. HENT: Negative for nosebleeds. Eyes: Negative for blurred vision and double vision. Respiratory: Negative for cough, shortness of breath and wheezing. Cardiovascular: Negative for chest pain, palpitations, orthopnea, claudication, leg swelling and PND. Gastrointestinal: Negative for abdominal pain, heartburn, nausea and vomiting. Genitourinary: Negative for dysuria and hematuria. Musculoskeletal: Negative for falls and myalgias. Skin: Negative for rash. Neurological: Negative for dizziness, focal weakness and headaches. Endo/Heme/Allergies: Does not bruise/bleed easily. Psychiatric/Behavioral: Negative for substance abuse.      Visit Vitals  /66   Pulse 60   Ht 5' 8\" (1.727 m)   Wt 78 kg (172 lb)   SpO2 97%   BMI 26.15 kg/m²      Physical Exam   Constitutional: He is oriented to person, place, and time. He appears well-developed and well-nourished. HENT:   Head: Normocephalic and atraumatic. Eyes: Conjunctivae are normal.   Neck: Neck supple. No JVD present. Carotid bruit is not present. Cardiovascular: Normal rate, regular rhythm, S1 normal, S2 normal and normal pulses. Exam reveals no gallop and no S3. No murmur heard. Pulmonary/Chest: Breath sounds normal. He has no wheezes. He has no rales. Abdominal: Soft. Bowel sounds are normal. There is no tenderness. Musculoskeletal: He exhibits no edema. Neurological: He is alert and oriented to person, place, and time. Skin: Skin is warm and dry. EKG: Normal sinus rhythm, left bundle branch block, left axis deviation. No change compared to previous EKG    ASSESSMENT and PLAN    Coronary artery disease. Patient has a history of multivessel stenting in 2009 with a drug-eluting stent to his mid LAD and RPDA. He had a bare-metal stent to his proximal RCA. He has been maintained on aspirin and Plavix since that time. He also remains on simvastatin. He was unable to tolerate a beta blocker due to to bradycardia. He last underwent a pharmacological nuclear stress test in May 2017 which did not show any significant ischemia. Patient can stop his Plavix if needed for any procedure for 1 week. No new symptoms concerning for angina. Dyslipidemia. Patient is on simvastatin 20 mg daily. This has been followed by his PCP. His goal LDL should be less than 70. Hypertension. Patient's blood pressure is historically well controlled on lisinopril as monotherapy. Left bundle-branch block. This is chronic in nature. Unchanged on today's EKG. Follow-up in 9 months, sooner if needed.

## 2018-11-12 NOTE — PROGRESS NOTES
Shamika Knight. presents today for   Chief Complaint   Patient presents with    Follow-up     6 month follow up - no cardiac complaints       Rocael Sawyer. preferred language for health care discussion is english/other. Is someone accompanying this pt? No    Is the patient using any DME equipment during OV? NO    Depression Screening:  No flowsheet data found. Learning Assessment:  Learning Assessment 5/7/2018   PRIMARY LEARNER Patient   PRIMARY LANGUAGE ENGLISH   LEARNER PREFERENCE PRIMARY DEMONSTRATION   ANSWERED BY patient   RELATIONSHIP SELF       Abuse Screening:  No flowsheet data found. Fall Risk  No flowsheet data found. Pt currently taking Antiplatelet therapy? Plavix    Coordination of Care:  1. Have you been to the ER, urgent care clinic since your last visit? Hospitalized since your last visit? No    2. Have you seen or consulted any other health care providers outside of the [address] [place] [address] since your last visit? Include any pap smears or colon screening.  No

## 2019-01-01 ENCOUNTER — APPOINTMENT (OUTPATIENT)
Dept: NON INVASIVE DIAGNOSTICS | Age: 79
DRG: 309 | End: 2019-01-01
Attending: HOSPITALIST
Payer: MEDICARE

## 2019-01-01 ENCOUNTER — APPOINTMENT (OUTPATIENT)
Dept: NON INVASIVE DIAGNOSTICS | Age: 79
DRG: 309 | End: 2019-01-01
Attending: PHYSICIAN ASSISTANT
Payer: MEDICARE

## 2019-01-01 ENCOUNTER — HOSPITAL ENCOUNTER (INPATIENT)
Age: 79
LOS: 3 days | Discharge: HOME | DRG: 309 | End: 2019-02-08
Attending: EMERGENCY MEDICINE | Admitting: HOSPITALIST
Payer: MEDICARE

## 2019-01-01 ENCOUNTER — APPOINTMENT (OUTPATIENT)
Dept: GENERAL RADIOLOGY | Age: 79
DRG: 309 | End: 2019-01-01
Attending: EMERGENCY MEDICINE
Payer: MEDICARE

## 2019-01-01 ENCOUNTER — HOSPITAL ENCOUNTER (OUTPATIENT)
Dept: NON INVASIVE DIAGNOSTICS | Age: 79
Discharge: HOME | End: 2019-03-20
Attending: INTERNAL MEDICINE
Payer: MEDICARE

## 2019-01-01 ENCOUNTER — HOSPITAL ENCOUNTER (OUTPATIENT)
Dept: RESPIRATORY THERAPY | Age: 79
Discharge: HOME | End: 2019-03-11
Attending: NURSE PRACTITIONER
Payer: MEDICARE

## 2019-01-01 ENCOUNTER — TELEPHONE (OUTPATIENT)
Dept: CARDIOLOGY CLINIC | Age: 79
End: 2019-01-01

## 2019-01-01 ENCOUNTER — OFFICE VISIT (OUTPATIENT)
Dept: CARDIOLOGY CLINIC | Age: 79
End: 2019-01-01

## 2019-01-01 VITALS
HEART RATE: 61 BPM | BODY MASS INDEX: 26.07 KG/M2 | OXYGEN SATURATION: 97 % | SYSTOLIC BLOOD PRESSURE: 153 MMHG | WEIGHT: 172 LBS | DIASTOLIC BLOOD PRESSURE: 80 MMHG | RESPIRATION RATE: 20 BRPM | HEIGHT: 68 IN | TEMPERATURE: 98 F

## 2019-01-01 VITALS
HEART RATE: 53 BPM | WEIGHT: 177 LBS | OXYGEN SATURATION: 98 % | BODY MASS INDEX: 26.83 KG/M2 | HEIGHT: 68 IN | SYSTOLIC BLOOD PRESSURE: 136 MMHG | DIASTOLIC BLOOD PRESSURE: 54 MMHG

## 2019-01-01 VITALS
BODY MASS INDEX: 26.22 KG/M2 | SYSTOLIC BLOOD PRESSURE: 142 MMHG | WEIGHT: 173 LBS | HEIGHT: 68 IN | HEART RATE: 52 BPM | DIASTOLIC BLOOD PRESSURE: 64 MMHG | OXYGEN SATURATION: 98 %

## 2019-01-01 DIAGNOSIS — I44.7 LEFT BUNDLE BRANCH BLOCK: ICD-10-CM

## 2019-01-01 DIAGNOSIS — E78.5 DYSLIPIDEMIA: ICD-10-CM

## 2019-01-01 DIAGNOSIS — I25.10 ATHEROSCLEROSIS OF NATIVE CORONARY ARTERY OF NATIVE HEART WITHOUT ANGINA PECTORIS: ICD-10-CM

## 2019-01-01 DIAGNOSIS — I44.7 LBBB (LEFT BUNDLE BRANCH BLOCK): ICD-10-CM

## 2019-01-01 DIAGNOSIS — R77.8 ELEVATED TROPONIN: ICD-10-CM

## 2019-01-01 DIAGNOSIS — I25.10 ATHEROSCLEROSIS OF NATIVE CORONARY ARTERY OF NATIVE HEART WITHOUT ANGINA PECTORIS: Primary | ICD-10-CM

## 2019-01-01 DIAGNOSIS — I48.92 ATRIAL FLUTTER WITH RAPID VENTRICULAR RESPONSE (HCC): ICD-10-CM

## 2019-01-01 DIAGNOSIS — I44.7 LEFT BUNDLE BRANCH BLOCK: Primary | ICD-10-CM

## 2019-01-01 DIAGNOSIS — R00.1 BRADYCARDIA: ICD-10-CM

## 2019-01-01 DIAGNOSIS — R06.02 SHORTNESS OF BREATH: ICD-10-CM

## 2019-01-01 DIAGNOSIS — I11.9 BENIGN HYPERTENSIVE HEART DISEASE WITHOUT HEART FAILURE: ICD-10-CM

## 2019-01-01 DIAGNOSIS — I48.92 ATRIAL FLUTTER WITH RAPID VENTRICULAR RESPONSE (HCC): Primary | ICD-10-CM

## 2019-01-01 LAB
ALBUMIN SERPL-MCNC: 3.4 G/DL (ref 3.4–5)
ALBUMIN SERPL-MCNC: 4.1 G/DL (ref 3.4–5)
ALBUMIN/GLOB SERPL: 1.3 {RATIO} (ref 0.8–1.7)
ALBUMIN/GLOB SERPL: 1.4 {RATIO} (ref 0.8–1.7)
ALP SERPL-CCNC: 50 U/L (ref 45–117)
ALP SERPL-CCNC: 56 U/L (ref 45–117)
ALT SERPL-CCNC: 35 U/L (ref 16–61)
ALT SERPL-CCNC: 37 U/L (ref 16–61)
ANION GAP SERPL CALC-SCNC: 6 MMOL/L (ref 3–18)
ANION GAP SERPL CALC-SCNC: 7 MMOL/L (ref 3–18)
ANION GAP SERPL CALC-SCNC: 7 MMOL/L (ref 3–18)
APTT PPP: 116.9 SEC (ref 23–36.4)
APTT PPP: 140.4 SEC (ref 23–36.4)
APTT PPP: 30.3 SEC (ref 23–36.4)
APTT PPP: 63.8 SEC (ref 23–36.4)
APTT PPP: 79 SEC (ref 23–36.4)
APTT PPP: 80.8 SEC (ref 23–36.4)
APTT PPP: 81.9 SEC (ref 23–36.4)
APTT PPP: 88.9 SEC (ref 23–36.4)
APTT PPP: 89.3 SEC (ref 23–36.4)
APTT PPP: >180 SEC (ref 23–36.4)
AST SERPL-CCNC: 32 U/L (ref 15–37)
AST SERPL-CCNC: 33 U/L (ref 15–37)
ATRIAL RATE: 144 BPM
ATRIAL RATE: 147 BPM
ATRIAL RATE: 24 BPM
BASOPHILS # BLD: 0 K/UL (ref 0–0.1)
BASOPHILS NFR BLD: 0 % (ref 0–2)
BILIRUB SERPL-MCNC: 0.8 MG/DL (ref 0.2–1)
BILIRUB SERPL-MCNC: 0.9 MG/DL (ref 0.2–1)
BUN SERPL-MCNC: 15 MG/DL (ref 7–18)
BUN SERPL-MCNC: 19 MG/DL (ref 7–18)
BUN SERPL-MCNC: 24 MG/DL (ref 7–18)
BUN/CREAT SERPL: 14 (ref 12–20)
BUN/CREAT SERPL: 17 (ref 12–20)
BUN/CREAT SERPL: 18 (ref 12–20)
CALCIUM SERPL-MCNC: 7.7 MG/DL (ref 8.5–10.1)
CALCIUM SERPL-MCNC: 8.4 MG/DL (ref 8.5–10.1)
CALCIUM SERPL-MCNC: 8.5 MG/DL (ref 8.5–10.1)
CALCULATED R AXIS, ECG10: -10 DEGREES
CALCULATED R AXIS, ECG10: -16 DEGREES
CALCULATED R AXIS, ECG10: -53 DEGREES
CALCULATED T AXIS, ECG11: -157 DEGREES
CALCULATED T AXIS, ECG11: -168 DEGREES
CALCULATED T AXIS, ECG11: 168 DEGREES
CHLORIDE SERPL-SCNC: 108 MMOL/L (ref 100–108)
CHLORIDE SERPL-SCNC: 110 MMOL/L (ref 100–108)
CHLORIDE SERPL-SCNC: 111 MMOL/L (ref 100–108)
CK MB CFR SERPL CALC: 4.3 % (ref 0–4)
CK MB CFR SERPL CALC: 4.6 % (ref 0–4)
CK MB CFR SERPL CALC: 4.6 % (ref 0–4)
CK MB SERPL-MCNC: 4.3 NG/ML (ref 5–25)
CK MB SERPL-MCNC: 5 NG/ML (ref 5–25)
CK MB SERPL-MCNC: 6.3 NG/ML (ref 5–25)
CK SERPL-CCNC: 109 U/L (ref 39–308)
CK SERPL-CCNC: 145 U/L (ref 39–308)
CK SERPL-CCNC: 94 U/L (ref 39–308)
CO2 SERPL-SCNC: 24 MMOL/L (ref 21–32)
CO2 SERPL-SCNC: 25 MMOL/L (ref 21–32)
CO2 SERPL-SCNC: 26 MMOL/L (ref 21–32)
CREAT SERPL-MCNC: 1.08 MG/DL (ref 0.6–1.3)
CREAT SERPL-MCNC: 1.09 MG/DL (ref 0.6–1.3)
CREAT SERPL-MCNC: 1.36 MG/DL (ref 0.6–1.3)
DIAGNOSIS, 93000: NORMAL
DIFFERENTIAL METHOD BLD: ABNORMAL
DIFFERENTIAL METHOD BLD: NORMAL
DIFFERENTIAL METHOD BLD: NORMAL
ECHO AO ROOT DIAM: 3.56 CM
ECHO AV REGURGITANT PHT: 502.5 CM
ECHO LA AREA 4C: 23.7 CM2
ECHO LA VOL 2C: 82.54 ML (ref 18–58)
ECHO LA VOL 4C: 71.63 ML (ref 18–58)
ECHO LA VOL BP: 89.41 ML (ref 18–58)
ECHO LA VOL/BSA BIPLANE: 46.87 ML/M2 (ref 16–28)
ECHO LA VOLUME INDEX A2C: 43.27 ML/M2 (ref 16–28)
ECHO LA VOLUME INDEX A4C: 37.55 ML/M2 (ref 16–28)
ECHO LV E' LATERAL VELOCITY: 9.03 CM/S
ECHO LV E' SEPTAL VELOCITY: 6.53 CM/S
ECHO LV EDV A2C: 93.8 ML
ECHO LV EDV A4C: 83.4 ML
ECHO LV EDV BP: 88.9 ML (ref 67–155)
ECHO LV EDV INDEX A4C: 43.7 ML/M2
ECHO LV EDV INDEX BP: 46.6 ML/M2
ECHO LV EDV NDEX A2C: 49.2 ML/M2
ECHO LV EJECTION FRACTION A2C: 43 %
ECHO LV EJECTION FRACTION A4C: 33 %
ECHO LV EJECTION FRACTION BIPLANE: 38 % (ref 55–100)
ECHO LV ESV A2C: 53.6 ML
ECHO LV ESV A4C: 55.5 ML
ECHO LV ESV BP: 55.1 ML (ref 22–58)
ECHO LV ESV INDEX A2C: 28.1 ML/M2
ECHO LV ESV INDEX A4C: 29.1 ML/M2
ECHO LV ESV INDEX BP: 28.9 ML/M2
ECHO LV INTERNAL DIMENSION DIASTOLIC: 4.54 CM (ref 4.2–5.9)
ECHO LV INTERNAL DIMENSION SYSTOLIC: 3.33 CM
ECHO LV IVSD: 0.89 CM (ref 0.6–1)
ECHO LV MASS 2D: 160.4 G (ref 88–224)
ECHO LV MASS INDEX 2D: 84.1 G/M2 (ref 49–115)
ECHO LV POSTERIOR WALL DIASTOLIC: 0.96 CM (ref 0.6–1)
ECHO LVOT DIAM: 1.97 CM
ECHO LVOT PEAK GRADIENT: 1.6 MMHG
ECHO LVOT PEAK VELOCITY: 62.37 CM/S
ECHO LVOT VTI: 11.95 CM
ECHO MV A VELOCITY: 20.69 CM/S
ECHO MV E DECELERATION TIME (DT): 154.4 MS
ECHO MV E VELOCITY: 0.5 CM/S
ECHO MV E/A RATIO: 0.02
ECHO MV E/E' LATERAL: 0.06
ECHO MV E/E' RATIO (AVERAGED): 0.07
ECHO MV E/E' SEPTAL: 0.08
ECHO PULMONARY ARTERY SYSTOLIC PRESSURE (PASP): 31 MMHG
ECHO RV TAPSE: 1.55 CM (ref 1.5–2)
ECHO TV REGURGITANT MAX VELOCITY: 263.15 CM/S
ECHO TV REGURGITANT PEAK GRADIENT: 27.7 MMHG
EOSINOPHIL # BLD: 0.2 K/UL (ref 0–0.4)
EOSINOPHIL # BLD: 0.3 K/UL (ref 0–0.4)
EOSINOPHIL # BLD: 0.4 K/UL (ref 0–0.4)
EOSINOPHIL NFR BLD: 2 % (ref 0–5)
EOSINOPHIL NFR BLD: 3 % (ref 0–5)
EOSINOPHIL NFR BLD: 5 % (ref 0–5)
ERYTHROCYTE [DISTWIDTH] IN BLOOD BY AUTOMATED COUNT: 12.8 % (ref 11.6–14.5)
ERYTHROCYTE [DISTWIDTH] IN BLOOD BY AUTOMATED COUNT: 13 % (ref 11.6–14.5)
ERYTHROCYTE [DISTWIDTH] IN BLOOD BY AUTOMATED COUNT: 13.1 % (ref 11.6–14.5)
ERYTHROCYTE [DISTWIDTH] IN BLOOD BY AUTOMATED COUNT: 13.1 % (ref 11.6–14.5)
GLOBULIN SER CALC-MCNC: 2.6 G/DL (ref 2–4)
GLOBULIN SER CALC-MCNC: 2.9 G/DL (ref 2–4)
GLUCOSE SERPL-MCNC: 117 MG/DL (ref 74–99)
GLUCOSE SERPL-MCNC: 122 MG/DL (ref 74–99)
GLUCOSE SERPL-MCNC: 99 MG/DL (ref 74–99)
HCT VFR BLD AUTO: 36.3 % (ref 36–48)
HCT VFR BLD AUTO: 39.4 % (ref 36–48)
HCT VFR BLD AUTO: 40.8 % (ref 36–48)
HCT VFR BLD AUTO: 42.7 % (ref 36–48)
HGB BLD-MCNC: 12.8 G/DL (ref 13–16)
HGB BLD-MCNC: 14.2 G/DL (ref 13–16)
HGB BLD-MCNC: 14.2 G/DL (ref 13–16)
HGB BLD-MCNC: 15.2 G/DL (ref 13–16)
LYMPHOCYTES # BLD: 2.7 K/UL (ref 0.9–3.6)
LYMPHOCYTES # BLD: 2.8 K/UL (ref 0.9–3.6)
LYMPHOCYTES # BLD: 3.2 K/UL (ref 0.9–3.6)
LYMPHOCYTES NFR BLD: 28 % (ref 21–52)
LYMPHOCYTES NFR BLD: 33 % (ref 21–52)
LYMPHOCYTES NFR BLD: 34 % (ref 21–52)
MAGNESIUM SERPL-MCNC: 2.1 MG/DL (ref 1.6–2.6)
MAGNESIUM SERPL-MCNC: 2.3 MG/DL (ref 1.6–2.6)
MCH RBC QN AUTO: 30 PG (ref 24–34)
MCH RBC QN AUTO: 30.1 PG (ref 24–34)
MCH RBC QN AUTO: 30.9 PG (ref 24–34)
MCH RBC QN AUTO: 31.1 PG (ref 24–34)
MCHC RBC AUTO-ENTMCNC: 34.8 G/DL (ref 31–37)
MCHC RBC AUTO-ENTMCNC: 35.3 G/DL (ref 31–37)
MCHC RBC AUTO-ENTMCNC: 35.6 G/DL (ref 31–37)
MCHC RBC AUTO-ENTMCNC: 36 G/DL (ref 31–37)
MCV RBC AUTO: 85.4 FL (ref 74–97)
MCV RBC AUTO: 86.1 FL (ref 74–97)
MCV RBC AUTO: 86.2 FL (ref 74–97)
MCV RBC AUTO: 86.8 FL (ref 74–97)
MONOCYTES # BLD: 0.6 K/UL (ref 0.05–1.2)
MONOCYTES # BLD: 0.8 K/UL (ref 0.05–1.2)
MONOCYTES # BLD: 0.9 K/UL (ref 0.05–1.2)
MONOCYTES NFR BLD: 7 % (ref 3–10)
MONOCYTES NFR BLD: 9 % (ref 3–10)
MONOCYTES NFR BLD: 9 % (ref 3–10)
NEUTS SEG # BLD: 4.6 K/UL (ref 1.8–8)
NEUTS SEG # BLD: 5 K/UL (ref 1.8–8)
NEUTS SEG # BLD: 6 K/UL (ref 1.8–8)
NEUTS SEG NFR BLD: 54 % (ref 40–73)
NEUTS SEG NFR BLD: 55 % (ref 40–73)
NEUTS SEG NFR BLD: 61 % (ref 40–73)
PISA AR MAX VEL: 405.87 CM/S
PLATELET # BLD AUTO: 192 K/UL (ref 135–420)
PLATELET # BLD AUTO: 193 K/UL (ref 135–420)
PLATELET # BLD AUTO: 202 K/UL (ref 135–420)
PLATELET # BLD AUTO: 221 K/UL (ref 135–420)
PMV BLD AUTO: 10.1 FL (ref 9.2–11.8)
PMV BLD AUTO: 10.1 FL (ref 9.2–11.8)
PMV BLD AUTO: 10.3 FL (ref 9.2–11.8)
PMV BLD AUTO: 10.4 FL (ref 9.2–11.8)
POTASSIUM SERPL-SCNC: 3.4 MMOL/L (ref 3.5–5.5)
POTASSIUM SERPL-SCNC: 3.6 MMOL/L (ref 3.5–5.5)
POTASSIUM SERPL-SCNC: 3.7 MMOL/L (ref 3.5–5.5)
PROT SERPL-MCNC: 6 G/DL (ref 6.4–8.2)
PROT SERPL-MCNC: 7 G/DL (ref 6.4–8.2)
Q-T INTERVAL, ECG07: 360 MS
Q-T INTERVAL, ECG07: 364 MS
Q-T INTERVAL, ECG07: 366 MS
QRS DURATION, ECG06: 138 MS
QRS DURATION, ECG06: 144 MS
QRS DURATION, ECG06: 146 MS
QTC CALCULATION (BEZET), ECG08: 557 MS
QTC CALCULATION (BEZET), ECG08: 558 MS
QTC CALCULATION (BEZET), ECG08: 561 MS
RBC # BLD AUTO: 4.25 M/UL (ref 4.7–5.5)
RBC # BLD AUTO: 4.57 M/UL (ref 4.7–5.5)
RBC # BLD AUTO: 4.74 M/UL (ref 4.7–5.5)
RBC # BLD AUTO: 4.92 M/UL (ref 4.7–5.5)
SODIUM SERPL-SCNC: 141 MMOL/L (ref 136–145)
SODIUM SERPL-SCNC: 141 MMOL/L (ref 136–145)
SODIUM SERPL-SCNC: 142 MMOL/L (ref 136–145)
STRESS BASELINE DIAS BP: 78 MMHG
STRESS BASELINE HR: 62 BPM
STRESS BASELINE SYS BP: 146 MMHG
STRESS ESTIMATED WORKLOAD: 1 METS
STRESS EXERCISE DUR MIN: NORMAL
STRESS PEAK DIAS BP: 78 MMHG
STRESS PEAK SYS BP: 152 MMHG
STRESS PERCENT HR ACHIEVED: 68 %
STRESS POST PEAK HR: 83 BPM
STRESS RATE PRESSURE PRODUCT: NORMAL BPM*MMHG
STRESS ST DEPRESSION: 0 MM
STRESS ST ELEVATION: 0 MM
STRESS TARGET HR: 122 BPM
T4 FREE SERPL-MCNC: 1.1 NG/DL (ref 0.7–1.5)
TROPONIN I SERPL-MCNC: 0.38 NG/ML (ref 0–0.04)
TROPONIN I SERPL-MCNC: 0.49 NG/ML (ref 0–0.04)
TROPONIN I SERPL-MCNC: 0.5 NG/ML (ref 0–0.04)
TSH SERPL DL<=0.05 MIU/L-ACNC: 5.92 UIU/ML (ref 0.36–3.74)
VENTRICULAR RATE, ECG03: 140 BPM
VENTRICULAR RATE, ECG03: 143 BPM
VENTRICULAR RATE, ECG03: 144 BPM
WBC # BLD AUTO: 7.7 K/UL (ref 4.6–13.2)
WBC # BLD AUTO: 8.3 K/UL (ref 4.6–13.2)
WBC # BLD AUTO: 9.2 K/UL (ref 4.6–13.2)
WBC # BLD AUTO: 9.8 K/UL (ref 4.6–13.2)

## 2019-01-01 PROCEDURE — 74011250637 HC RX REV CODE- 250/637: Performed by: FAMILY MEDICINE

## 2019-01-01 PROCEDURE — 65660000004 HC RM CVT STEPDOWN

## 2019-01-01 PROCEDURE — 94060 EVALUATION OF WHEEZING: CPT

## 2019-01-01 PROCEDURE — 74011250637 HC RX REV CODE- 250/637: Performed by: HOSPITALIST

## 2019-01-01 PROCEDURE — 85730 THROMBOPLASTIN TIME PARTIAL: CPT

## 2019-01-01 PROCEDURE — 93005 ELECTROCARDIOGRAM TRACING: CPT

## 2019-01-01 PROCEDURE — 85025 COMPLETE CBC W/AUTO DIFF WBC: CPT

## 2019-01-01 PROCEDURE — 74011000250 HC RX REV CODE- 250: Performed by: STUDENT IN AN ORGANIZED HEALTH CARE EDUCATION/TRAINING PROGRAM

## 2019-01-01 PROCEDURE — 80053 COMPREHEN METABOLIC PANEL: CPT

## 2019-01-01 PROCEDURE — 83735 ASSAY OF MAGNESIUM: CPT

## 2019-01-01 PROCEDURE — 74011250637 HC RX REV CODE- 250/637: Performed by: PHYSICIAN ASSISTANT

## 2019-01-01 PROCEDURE — 74011250636 HC RX REV CODE- 250/636: Performed by: EMERGENCY MEDICINE

## 2019-01-01 PROCEDURE — 94726 PLETHYSMOGRAPHY LUNG VOLUMES: CPT

## 2019-01-01 PROCEDURE — 74011250636 HC RX REV CODE- 250/636: Performed by: HOSPITALIST

## 2019-01-01 PROCEDURE — 93226 XTRNL ECG REC<48 HR SCAN A/R: CPT

## 2019-01-01 PROCEDURE — 85027 COMPLETE CBC AUTOMATED: CPT

## 2019-01-01 PROCEDURE — 36415 COLL VENOUS BLD VENIPUNCTURE: CPT

## 2019-01-01 PROCEDURE — 74011250637 HC RX REV CODE- 250/637: Performed by: INTERNAL MEDICINE

## 2019-01-01 PROCEDURE — 74011000250 HC RX REV CODE- 250: Performed by: EMERGENCY MEDICINE

## 2019-01-01 PROCEDURE — 74011250636 HC RX REV CODE- 250/636: Performed by: FAMILY MEDICINE

## 2019-01-01 PROCEDURE — 84439 ASSAY OF FREE THYROXINE: CPT

## 2019-01-01 PROCEDURE — 82550 ASSAY OF CK (CPK): CPT

## 2019-01-01 PROCEDURE — 96366 THER/PROPH/DIAG IV INF ADDON: CPT

## 2019-01-01 PROCEDURE — 94729 DIFFUSING CAPACITY: CPT

## 2019-01-01 PROCEDURE — 84443 ASSAY THYROID STIM HORMONE: CPT

## 2019-01-01 PROCEDURE — 71045 X-RAY EXAM CHEST 1 VIEW: CPT

## 2019-01-01 PROCEDURE — 74011000258 HC RX REV CODE- 258: Performed by: EMERGENCY MEDICINE

## 2019-01-01 PROCEDURE — A9500 TC99M SESTAMIBI: HCPCS

## 2019-01-01 PROCEDURE — 96375 TX/PRO/DX INJ NEW DRUG ADDON: CPT

## 2019-01-01 PROCEDURE — 99285 EMERGENCY DEPT VISIT HI MDM: CPT

## 2019-01-01 PROCEDURE — 80048 BASIC METABOLIC PNL TOTAL CA: CPT

## 2019-01-01 PROCEDURE — 74011250636 HC RX REV CODE- 250/636: Performed by: PHYSICIAN ASSISTANT

## 2019-01-01 PROCEDURE — 96365 THER/PROPH/DIAG IV INF INIT: CPT

## 2019-01-01 PROCEDURE — 93306 TTE W/DOPPLER COMPLETE: CPT

## 2019-01-01 RX ORDER — BACLOFEN 10 MG/1
10 TABLET ORAL 2 TIMES DAILY
COMMUNITY

## 2019-01-01 RX ORDER — TRAMADOL HYDROCHLORIDE 50 MG/1
50 TABLET ORAL
Status: DISCONTINUED | OUTPATIENT
Start: 2019-01-01 | End: 2019-01-01 | Stop reason: HOSPADM

## 2019-01-01 RX ORDER — HEPARIN SODIUM 10000 [USP'U]/100ML
18-36 INJECTION, SOLUTION INTRAVENOUS
Status: DISCONTINUED | OUTPATIENT
Start: 2019-01-01 | End: 2019-01-01

## 2019-01-01 RX ORDER — OMEGA-3-ACID ETHYL ESTERS 1 G/1
2 CAPSULE, LIQUID FILLED ORAL 2 TIMES DAILY
Status: DISCONTINUED | OUTPATIENT
Start: 2019-01-01 | End: 2019-01-01 | Stop reason: HOSPADM

## 2019-01-01 RX ORDER — METOPROLOL TARTRATE 5 MG/5ML
5 INJECTION INTRAVENOUS ONCE
Status: COMPLETED | OUTPATIENT
Start: 2019-01-01 | End: 2019-01-01

## 2019-01-01 RX ORDER — ONDANSETRON 2 MG/ML
4 INJECTION INTRAMUSCULAR; INTRAVENOUS
Status: COMPLETED | OUTPATIENT
Start: 2019-01-01 | End: 2019-01-01

## 2019-01-01 RX ORDER — CLONAZEPAM 0.5 MG/1
0.5 TABLET ORAL DAILY
Status: DISCONTINUED | OUTPATIENT
Start: 2019-01-01 | End: 2019-01-01

## 2019-01-01 RX ORDER — ADENOSINE 3 MG/ML
12 INJECTION, SOLUTION INTRAVENOUS
Status: DISCONTINUED | OUTPATIENT
Start: 2019-01-01 | End: 2019-01-01

## 2019-01-01 RX ORDER — CLOPIDOGREL BISULFATE 75 MG/1
75 TABLET ORAL DAILY
Status: DISCONTINUED | OUTPATIENT
Start: 2019-01-01 | End: 2019-01-01

## 2019-01-01 RX ORDER — METOPROLOL TARTRATE 5 MG/5ML
5 INJECTION INTRAVENOUS
Status: COMPLETED | OUTPATIENT
Start: 2019-01-01 | End: 2019-01-01

## 2019-01-01 RX ORDER — GUAIFENESIN 100 MG/5ML
81 LIQUID (ML) ORAL DAILY
Status: DISCONTINUED | OUTPATIENT
Start: 2019-01-01 | End: 2019-01-01 | Stop reason: HOSPADM

## 2019-01-01 RX ORDER — AMIODARONE HYDROCHLORIDE 200 MG/1
200 TABLET ORAL DAILY
Qty: 30 TAB | Refills: 1 | Status: SHIPPED | OUTPATIENT
Start: 2019-01-01

## 2019-01-01 RX ORDER — AMIODARONE HYDROCHLORIDE 200 MG/1
200 TABLET ORAL DAILY
Status: DISCONTINUED | OUTPATIENT
Start: 2019-01-01 | End: 2019-01-01 | Stop reason: HOSPADM

## 2019-01-01 RX ORDER — LISINOPRIL 5 MG/1
5 TABLET ORAL DAILY
Status: DISCONTINUED | OUTPATIENT
Start: 2019-01-01 | End: 2019-01-01 | Stop reason: HOSPADM

## 2019-01-01 RX ORDER — SIMVASTATIN 20 MG/1
20 TABLET, FILM COATED ORAL
Status: DISCONTINUED | OUTPATIENT
Start: 2019-01-01 | End: 2019-01-01 | Stop reason: HOSPADM

## 2019-01-01 RX ORDER — CLONAZEPAM 0.5 MG/1
0.5 TABLET ORAL
Status: DISCONTINUED | OUTPATIENT
Start: 2019-01-01 | End: 2019-01-01 | Stop reason: HOSPADM

## 2019-01-01 RX ORDER — HEPARIN SODIUM 1000 [USP'U]/ML
80 INJECTION, SOLUTION INTRAVENOUS; SUBCUTANEOUS ONCE
Status: COMPLETED | OUTPATIENT
Start: 2019-01-01 | End: 2019-01-01

## 2019-01-01 RX ORDER — AMIODARONE HYDROCHLORIDE 200 MG/1
200 TABLET ORAL DAILY
Qty: 30 TAB | Refills: 0 | Status: SHIPPED | OUTPATIENT
Start: 2019-01-01 | End: 2019-01-01 | Stop reason: SDUPTHER

## 2019-01-01 RX ORDER — DILTIAZEM HYDROCHLORIDE 5 MG/ML
20 INJECTION INTRAVENOUS
Status: COMPLETED | OUTPATIENT
Start: 2019-01-01 | End: 2019-01-01

## 2019-01-01 RX ORDER — DILTIAZEM HYDROCHLORIDE 5 MG/ML
INJECTION INTRAVENOUS
Status: DISPENSED
Start: 2019-01-01 | End: 2019-01-01

## 2019-01-01 RX ORDER — HEPARIN SODIUM 1000 [USP'U]/ML
40 INJECTION, SOLUTION INTRAVENOUS; SUBCUTANEOUS ONCE
Status: COMPLETED | OUTPATIENT
Start: 2019-01-01 | End: 2019-01-01

## 2019-01-01 RX ORDER — SODIUM CHLORIDE 9 MG/ML
75 INJECTION, SOLUTION INTRAVENOUS CONTINUOUS
Status: DISPENSED | OUTPATIENT
Start: 2019-01-01 | End: 2019-01-01

## 2019-01-01 RX ORDER — SODIUM CHLORIDE 9 MG/ML
250 INJECTION, SOLUTION INTRAVENOUS ONCE
Status: COMPLETED | OUTPATIENT
Start: 2019-01-01 | End: 2019-01-01

## 2019-01-01 RX ORDER — ADENOSINE 3 MG/ML
6 INJECTION, SOLUTION INTRAVENOUS
Status: COMPLETED | OUTPATIENT
Start: 2019-01-01 | End: 2019-01-01

## 2019-01-01 RX ORDER — UREA 10 %
1 LOTION (ML) TOPICAL ONCE
Status: COMPLETED | OUTPATIENT
Start: 2019-01-01 | End: 2019-01-01

## 2019-01-01 RX ADMIN — ASPIRIN 81 MG 81 MG: 81 TABLET ORAL at 09:08

## 2019-01-01 RX ADMIN — SIMVASTATIN 20 MG: 20 TABLET, FILM COATED ORAL at 21:33

## 2019-01-01 RX ADMIN — ASPIRIN 81 MG 81 MG: 81 TABLET ORAL at 15:29

## 2019-01-01 RX ADMIN — SODIUM CHLORIDE 75 ML/HR: 900 INJECTION, SOLUTION INTRAVENOUS at 02:38

## 2019-01-01 RX ADMIN — HEPARIN SODIUM 3130 UNITS: 1000 INJECTION INTRAVENOUS; SUBCUTANEOUS at 08:53

## 2019-01-01 RX ADMIN — LISINOPRIL 5 MG: 5 TABLET ORAL at 15:29

## 2019-01-01 RX ADMIN — SODIUM CHLORIDE 10 MG/HR: 900 INJECTION, SOLUTION INTRAVENOUS at 18:41

## 2019-01-01 RX ADMIN — ASPIRIN 81 MG 81 MG: 81 TABLET ORAL at 09:34

## 2019-01-01 RX ADMIN — CLONAZEPAM 0.5 MG: 0.5 TABLET ORAL at 21:33

## 2019-01-01 RX ADMIN — CLOPIDOGREL BISULFATE 75 MG: 75 TABLET, FILM COATED ORAL at 09:08

## 2019-01-01 RX ADMIN — AMIODARONE HYDROCHLORIDE 200 MG: 200 TABLET ORAL at 10:50

## 2019-01-01 RX ADMIN — HEPARIN SODIUM 18 UNITS/KG/HR: 10000 INJECTION, SOLUTION INTRAVENOUS at 21:33

## 2019-01-01 RX ADMIN — HEPARIN SODIUM 11 UNITS/KG/HR: 10000 INJECTION, SOLUTION INTRAVENOUS at 05:15

## 2019-01-01 RX ADMIN — AMIODARONE HYDROCHLORIDE 150 MG: 1.5 INJECTION, SOLUTION INTRAVENOUS at 02:11

## 2019-01-01 RX ADMIN — LISINOPRIL 5 MG: 5 TABLET ORAL at 09:08

## 2019-01-01 RX ADMIN — CLONAZEPAM 0.5 MG: 0.5 TABLET ORAL at 22:11

## 2019-01-01 RX ADMIN — ADENOSINE 6 MG: 3 INJECTION, SOLUTION INTRAVENOUS at 20:26

## 2019-01-01 RX ADMIN — AMIODARONE HYDROCHLORIDE 1 MG/MIN: 1.8 INJECTION, SOLUTION INTRAVENOUS at 02:38

## 2019-01-01 RX ADMIN — OMEGA-3-ACID ETHYL ESTERS 2000 MG: 1 CAPSULE, LIQUID FILLED ORAL at 09:35

## 2019-01-01 RX ADMIN — OMEGA-3-ACID ETHYL ESTERS 2000 MG: 1 CAPSULE, LIQUID FILLED ORAL at 18:00

## 2019-01-01 RX ADMIN — AMIODARONE HYDROCHLORIDE 200 MG: 200 TABLET ORAL at 15:29

## 2019-01-01 RX ADMIN — OMEGA-3-ACID ETHYL ESTERS 2000 MG: 1 CAPSULE, LIQUID FILLED ORAL at 17:56

## 2019-01-01 RX ADMIN — ONDANSETRON 4 MG: 2 INJECTION INTRAMUSCULAR; INTRAVENOUS at 18:31

## 2019-01-01 RX ADMIN — HEPARIN SODIUM 11 UNITS/KG/HR: 10000 INJECTION, SOLUTION INTRAVENOUS at 21:03

## 2019-01-01 RX ADMIN — OMEGA-3-ACID ETHYL ESTERS 2000 MG: 1 CAPSULE, LIQUID FILLED ORAL at 09:08

## 2019-01-01 RX ADMIN — METOPROLOL TARTRATE 5 MG: 1 INJECTION, SOLUTION INTRAVENOUS at 23:19

## 2019-01-01 RX ADMIN — DILTIAZEM HYDROCHLORIDE 20 MG: 5 INJECTION INTRAVENOUS at 18:31

## 2019-01-01 RX ADMIN — METOPROLOL TARTRATE 5 MG: 1 INJECTION, SOLUTION INTRAVENOUS at 20:30

## 2019-01-01 RX ADMIN — Medication 1 MG: at 01:31

## 2019-01-01 RX ADMIN — REGADENOSON 0.4 MG: 0.08 INJECTION, SOLUTION INTRAVENOUS at 10:35

## 2019-01-01 RX ADMIN — SODIUM CHLORIDE 250 ML: 900 INJECTION, SOLUTION INTRAVENOUS at 10:35

## 2019-01-01 RX ADMIN — AMIODARONE HYDROCHLORIDE 0.5 MG/MIN: 1.8 INJECTION, SOLUTION INTRAVENOUS at 21:03

## 2019-01-01 RX ADMIN — AMIODARONE HYDROCHLORIDE 1 MG/MIN: 1.8 INJECTION, SOLUTION INTRAVENOUS at 09:27

## 2019-01-01 RX ADMIN — SIMVASTATIN 20 MG: 20 TABLET, FILM COATED ORAL at 21:03

## 2019-01-01 RX ADMIN — CLOPIDOGREL BISULFATE 75 MG: 75 TABLET, FILM COATED ORAL at 09:34

## 2019-01-01 RX ADMIN — RIVAROXABAN 20 MG: 20 TABLET, FILM COATED ORAL at 15:56

## 2019-01-01 RX ADMIN — HEPARIN SODIUM 6170 UNITS: 1000 INJECTION INTRAVENOUS; SUBCUTANEOUS at 21:28

## 2019-02-05 PROBLEM — I48.92 ATRIAL FLUTTER WITH RAPID VENTRICULAR RESPONSE (HCC): Status: ACTIVE | Noted: 2019-01-01

## 2019-02-05 NOTE — ED PROVIDER NOTES
EMERGENCY DEPARTMENT HISTORY AND PHYSICAL EXAM 
 
6:28 PM 
 
 
Date: 2/5/2019 Patient Name: Gloria Bee History of Presenting Illness Chief Complaint Patient presents with  Irregular Heart Beat  Shortness of Breath History Provided By: Patient Chief Complaint: Rapid heart rate Duration:  Days Timing:  Gradual 
Location: Not applicable Quality: Not painful Severity: Moderate Modifying Factors: Worse with exertion, better with rest 
Associated Symptoms: denies any other associated signs or symptoms Additional History (Context): Gloria Bee is a 70 y.o. male with MI in 2008 and minor stroke in 2002 on plavix who presents with rapid heart rate while being seen by [facility] today. Patient have GI illness with N/V/D that started 4 days ago. Those symptoms have resolved. For the past 2 days the patient has been very tired with dyspnea on exertion. No chest pain. PCP: Kathia Martini MD 
 
Current Facility-Administered Medications Medication Dose Route Frequency Provider Last Rate Last Dose  dilTIAZem (CARDIZEM) 100 mg in 0.9% sodium chloride (MBP/ADV) 100 mL infusion  5-15 mg/hr IntraVENous TITRATE Shawna Willis MD 10 mL/hr at 02/05/19 1841 10 mg/hr at 02/05/19 1841  
 adenosine (ADENOCARD) injection 6 mg  6 mg IntraVENous NOW Shawna Willis MD      
 adenosine (ADENOCARD) injection 12 mg  12 mg IntraVENous NOW Shawna Willis MD      
 metoprolol (LOPRESSOR) injection 5 mg  5 mg IntraVENous NOW Shawna Willis MD      
 
Current Outpatient Medications Medication Sig Dispense Refill  nitroglycerin (NITROSTAT) 0.4 mg SL tablet DISSOLVE 1 TABLET UNDER THE TONGUE  EVERY 5 MINUTES AS NEEDED FOR CHEST PAIN 3 Bottle 3  
 TURMERIC PO Take  by mouth.  lisinopril (PRINIVIL, ZESTRIL) 5 mg tablet TAKE 1 TABLET DAILY 90 Tab 3  clopidogrel (PLAVIX) 75 mg tab TAKE 1 TABLET DAILY 90 Tab 3  
  traMADol (ULTRAM) 50 mg tablet Take 50 mg by mouth every six (6) hours as needed for Pain.  VITAMIN B COMPLEX (B-100 COMPLEX PO) Take  by mouth.  UBIDECARENONE/VITAMIN E MIXED (COQ10  PO) Take  by mouth daily.  simvastatin (ZOCOR) 20 mg tablet Take 20 mg by mouth nightly.  aspirin 81 mg tablet Take 81 mg by mouth daily.  omega-3 acid ethyl esters (LOVAZA) 1 gram capsule Take 2 g by mouth two (2) times a day.  clonazepam (KLONOPIN) 1 mg tablet Take 0.5 mg by mouth daily. Past History Past Medical History: 
Past Medical History:  
Diagnosis Date  Cardiac catheterization 03/11/2009 LM mild. p/mLAD 70%. mLAD stent patent. oD 85%. CX 30%. mRCA 35%. p/dRCA stents patent. RPLB 30%. Unsuccessful PTCA of diagonal lesion.  Cardiac echocardiogram 03/10/2009 Tech. difficult. EF 50%. Mild apical hypk. Mod increased ventricular septal wall thickness. Mild LAE. Mild-mod AI.  Cardiac Holter monitor 01/28/2015 Baseline bradycardia w/bundle branch block. Max HR of 91 bpm may indicate chronotropic incompetence. Patient's events mainly corresponded to PVCs w/ventricular trigeminy. PVC burden <1% in 48-hour study.  Cardiac nuclear imaging test 08/04/2014 No ischemia or prior infarction. EF 62%. No RWMA. Nondiagnostic EKG on pharm stress test.  Low risk.  Coronary artery disease MI and stents placed approx 2012  Dyslipidemia  Hypertension  PACs  Sleep apnea   
 uses cpap  Stroke [facility])   
 '04  residual R paraesthesias Past Surgical History: 
Past Surgical History:  
Procedure Laterality Date  COLONOSCOPY N/A 10/18/2016 COLONOSCOPY and polypectomies performed by Sera Angeles MD at [facility]  2012 (approx) 2 stents  SHOULDER SURG PROC UNLISTED    
 '99 Family History: 
Family History Problem Relation Age of Onset  Heart Failure Mother  Diabetes Mother  Stroke Father  Diabetes Father Social History: 
Social History Tobacco Use  Smoking status: Never Smoker  Smokeless tobacco: Never Used Substance Use Topics  Alcohol use: Yes Comment: beer occasionally  Drug use: No  
 
 
Allergies: 
No Known Allergies Review of Systems Review of Systems Constitutional: Positive for fatigue. Negative for activity change and fever. HENT: Negative for congestion and rhinorrhea. Eyes: Negative for visual disturbance. Respiratory: Positive for shortness of breath. Cardiovascular: Negative for chest pain, palpitations and leg swelling. Gastrointestinal: Negative for abdominal pain, diarrhea, nausea and vomiting. Genitourinary: Negative for dysuria and hematuria. Musculoskeletal: Negative for back pain. Skin: Negative for rash. Neurological: Negative for dizziness, weakness and light-headedness. All other systems reviewed and are negative. Physical Exam  
 
Visit Vitals BP (!) 144/96 (BP 1 Location: Left arm, BP Patient Position: At rest) Pulse (!) 146 Temp 98.1 °F (36.7 °C) Resp 18 Wt 77.1 kg (170 lb) SpO2 100% BMI 25.85 kg/m² Physical Exam  
Constitutional: He is oriented to person, place, and time. He appears well-developed and well-nourished. HENT:  
Head: Normocephalic and atraumatic. Eyes: Conjunctivae and EOM are normal. Pupils are equal, round, and reactive to light. Neck: Normal range of motion. Neck supple. Cardiovascular: Regular rhythm. No murmur heard. Tachycardic Pulmonary/Chest: Effort normal and breath sounds normal.  
Abdominal: Soft. Bowel sounds are normal.  
Musculoskeletal: Normal range of motion. Neurological: He is alert and oriented to person, place, and time. Skin: Skin is warm and dry. Diagnostic Study Results Labs - Recent Results (from the past 12 hour(s)) CBC WITH AUTOMATED DIFF  
 Collection Time: 02/05/19  6:28 PM  
Result Value Ref Range WBC 9.8 4.6 - 13.2 K/uL  
 RBC 4.92 4.70 - 5.50 M/uL  
 HGB 15.2 13.0 - 16.0 g/dL HCT 42.7 36.0 - 48.0 % MCV 86.8 74.0 - 97.0 FL  
 MCH 30.9 24.0 - 34.0 PG  
 MCHC 35.6 31.0 - 37.0 g/dL  
 RDW 13.1 11.6 - 14.5 % PLATELET 041 593 - 043 K/uL MPV 10.3 9.2 - 11.8 FL  
 NEUTROPHILS 61 40 - 73 % LYMPHOCYTES 28 21 - 52 % MONOCYTES 9 3 - 10 % EOSINOPHILS 2 0 - 5 % BASOPHILS 0 0 - 2 %  
 ABS. NEUTROPHILS 6.0 1.8 - 8.0 K/UL  
 ABS. LYMPHOCYTES 2.8 0.9 - 3.6 K/UL  
 ABS. MONOCYTES 0.9 0.05 - 1.2 K/UL  
 ABS. EOSINOPHILS 0.2 0.0 - 0.4 K/UL  
 ABS. BASOPHILS 0.0 0.0 - 0.1 K/UL  
 DF AUTOMATED METABOLIC PANEL, COMPREHENSIVE Collection Time: 02/05/19  6:28 PM  
Result Value Ref Range Sodium 141 136 - 145 mmol/L Potassium 3.7 3.5 - 5.5 mmol/L Chloride 108 100 - 108 mmol/L  
 CO2 26 21 - 32 mmol/L Anion gap 7 3.0 - 18 mmol/L Glucose 117 (H) 74 - 99 mg/dL BUN 24 (H) 7.0 - 18 MG/DL Creatinine 1.36 (H) 0.6 - 1.3 MG/DL  
 BUN/Creatinine ratio 18 12 - 20 GFR est AA >60 >60 ml/min/1.73m2 GFR est non-AA 51 (L) >60 ml/min/1.73m2 Calcium 8.4 (L) 8.5 - 10.1 MG/DL Bilirubin, total 0.8 0.2 - 1.0 MG/DL  
 ALT (SGPT) 35 16 - 61 U/L  
 AST (SGOT) 32 15 - 37 U/L Alk. phosphatase 56 45 - 117 U/L Protein, total 7.0 6.4 - 8.2 g/dL Albumin 4.1 3.4 - 5.0 g/dL Globulin 2.9 2.0 - 4.0 g/dL A-G Ratio 1.4 0.8 - 1.7 CARDIAC PANEL,(CK, CKMB & TROPONIN) Collection Time: 02/05/19  6:28 PM  
Result Value Ref Range  39 - 308 U/L  
 CK - MB 6.3 (H) <3.6 ng/ml CK-MB Index 4.3 (H) 0.0 - 4.0 % Troponin-I, QT 0.49 (H) 0.0 - 0.045 NG/ML  
MAGNESIUM Collection Time: 02/05/19  6:28 PM  
Result Value Ref Range Magnesium 2.3 1.6 - 2.6 mg/dL Radiologic Studies -  
XR CHEST PORT    (Results Pending) Medical Decision Making It should be noted that ICherelle MD will be the provider of record for this patient. I reviewed the vital signs, available nursing notes, past medical history, past surgical history, family history and social history. Vital Signs-Reviewed the patient's vital signs. EKG: Interpreted by the EP. Time Interpreted: Upon arrival 
 Rate: 144 Rhythm: Wide complex, regular tachycardia Interpretation:  Likely atrial flutter with 2:1 conduction Comparison: Prior EKG shows LBBB Records Reviewed: Old Medical Records (Time of Review: 6:28 PM) ED Course: Progress Notes, Reevaluation, and Consults: 
Patient is hemodynamically stable upon arrival.  Tachycardic to the 140's with normal blood pressure, no chest pain, mentating appropriately. Patient was given Diltiazem 20 mg bolus as well as diltiazem infusion with no change in heart rate. Given this, decided to administer adenosine to ensure atrial flutter with aberrancy vs. V-tach. With administration of 6 mg adenosine, rhythm strip showed saw-tooth pattern consistent with atrial flutter. Discussed case with both Cardiology and Internal medicine. Patient to be admitted. Provider Notes (Medical Decision Making): EKG shows wide-complex tachycardia. Old EKG shows history of baseline LBBB. Based on EKG believe this is most likely a-flutter with aberrancy. Adenosine confirmed diagnosis. Slight bump in troponin, likely type II NSTEMI. Cardiology will evaluate further during admission. For Hospitalized Patients: 
 
1. Hospitalization Decision Time: The decision to hospitalize the patient was made by Dr. Kimberly Reddy at 8:33 PM 
 on 2/5/2019 Diagnosis Clinical Impression: 1. Atrial flutter with rapid ventricular response ([address] 2. Elevated troponin Disposition: Admitted Follow-up Information None Medication List  
  
ASK your doctor about these medications   
aspirin 81 mg tablet B-100 COMPLEX PO 
 clonazePAM 1 mg tablet Commonly known as:  KlonoPIN 
  
clopidogrel 75 mg Tab Commonly known as:  PLAVIX TAKE 1 TABLET DAILY 
  
COQ10  PO 
  
lisinopril 5 mg tablet Commonly known as:  PRINIVIL, ZESTRIL 
TAKE 1 TABLET DAILY 
  
LOVAZA 1 gram capsule Generic drug:  omega-3 acid ethyl esters 
  
nitroglycerin 0.4 mg SL tablet Commonly known as:  NITROSTAT 
DISSOLVE 1 TABLET UNDER THE TONGUE  EVERY 5 MINUTES AS NEEDED FOR CHEST PAIN 
  
traMADol 50 mg tablet Commonly known as:  ULTRAM 
  
TURMERIC PO 
  
ZOCOR 20 mg tablet Generic drug:  simvastatin 
  
  
 
_______________________________ 
 
 
8:39 PM 
I have personally seen and examined this patient. I have fully participated in the care of this patient. I have reviewed all pertinent clinical information, including history, physical exam and plan prior to discharge of the patient from the emergency department. History of presenting illness(es) was obtained and documented by the physician extender. Physical exam was performed by the physician extender under my direct supervision. I, as the supervising physician, was at the bedside to perform a direct face-to-face PMH, ROS, HPI, and physical examination of the patient in addition to the physician extender's evaluation. Critical Care Note: 
 
Critical care minutes: 60 MINUTES. Given patient's presentation with atrial fibrillation with rapid ventricular response as well as other clinical findings, total bedside time evaluating the patient's hemodynamic status and response to medications as well as the underlying cardiac rhythm was as noted above. Given the patients underlying condition, requiring numerous reevaluations of patient's vital signs and response to different emergency department therapies, total bedside time evaluating and/or treating the patient, not including procedures, is noted above. Consult Cardiology The on call cardiologist was called and the patient was presented for cardiology consult. I personally spoke with AYALA Iqbal, in the cardiology group, about the patient's presentation and management. I subsequently placed the noted cardiologist on the treatment team. 
 
Admit to Hospitalist 
 
The patient was presented to the accepting hospitalist, Dr. Mode Wilson. The patient's primary doctor is Mason Freire MD, and admissions for this physician are with the hospitalist.  If the patient has no primary doctor, then admission is to the hospitalist as well. As the emergency physician, I wrote courtesy admission orders for the hospitalist physician. The courtesy orders included explicit instructions for the floor nursing staff to call the admitting attending physician upon patient arrival on the floor. Coding Diagnoses Clinical Impression: 1. Atrial flutter with rapid ventricular response ([address] 2. Elevated troponin Disposition Disposition:  Admit. Angelique Leger M.D. DAINA Board Certified Emergency Physician

## 2019-02-05 NOTE — ED TRIAGE NOTES
Patient arrived from home c/o irregular heart beat and shortness of breath. Patient states symptoms began this weekend after eating  food. Patient takes several home medications. HX MI stroke

## 2019-02-05 NOTE — ED NOTES
I performed a brief evaluation, including history and physical, of the patient here in triage and I have determined that pt will need further treatment and evaluation from the main side ER physician. I have placed initial orders to help in expediting patients care. February 05, 2019 at 5:51 PM - AYALA Lora Visit Vitals BP (!) 144/96 (BP 1 Location: Left arm, BP Patient Position: At rest) Pulse (!) 146 Temp 98.1 °F (36.7 °C) Resp 18 Wt 77.1 kg (170 lb) SpO2 100% BMI 25.85 kg/m²

## 2019-02-05 NOTE — ED NOTES
Called Pharmacy to verify patient Jessica kim spoke with Nila Blevins will bring the medication down when the medication is mixed

## 2019-02-05 NOTE — TELEPHONE ENCOUNTER
I was called by Dr. Sherwin Banegas because this patient came into his office with short of breath and was found to be in atrial flutter with 2 to 1 block and a complete left bundle branch block with an overall heart rate of 150. I recommended that the patient be sent to the emergency room for treatment. It should be noted that historically this patient has had a chronic left bundle branch block. I will scan in the EKG is that were faxed from Dr. Anson Lara office into the chart.   ES

## 2019-02-06 NOTE — ED NOTES
Talked to  about the patient HR of 140, he stated \"to restart the cardizem drip at the previous rate\"

## 2019-02-06 NOTE — ED NOTES
Talked to Dr. Kuldip Reynaga about patient HR staying at 132, and he said  \"the medication will take some time and he is okay now\"

## 2019-02-06 NOTE — ED NOTES
Talked to Dr. Mecca Terrell of patient HR of 141 after increasing the cardizem to 15mg/hr per existing order. No new orders at this time,  \"stated I might start him on amiodarone,let me think\"

## 2019-02-06 NOTE — H&P
History & Physical 
Patient: Ramana Aparicio MRN: [id number]  CSN: [id number] YOB: 1940  Age: 70 y.o. Sex: male DOA: 2/5/2019 Chief Complaint:  
Chief Complaint Patient presents with  Irregular Heart Beat  Shortness of Breath HPI:  
 
 
 
70year old male with past medical history of HTN, HLD, CVA, CAD with stents and known LBBB presented with with rapid heart rate while being seen by [facility] today. Lionel Swanson He has been having  exertional shortness of breath and low energy for last three days. He further stated that he started feeling unwell on last Saturday when he was having vomiting, dry heaves and diarrhea which improved on Sunday with imodium. Later he developed shortness of breath which worsens with exertion. He denies any headache, dizziness, chest pain, orthopnea, nausea, vomiting, fever, chills and diarrhea. He presented with rapid heart rate while being seen by [facility] today. On arrival to emergency room his VS were stable except HR in 140s. EKG revealed wide complex tachycardia, LBBB. In emergency he room he t was given Diltiazem 20 mg bolus as well as diltiazem infusion with no change in heart rate. Given this, decided to administer adenosine to ensure atrial flutter with aberrancy vs. V-tach. With administration of 6 mg adenosine, rhythm strip showed saw-tooth pattern consistent with atrial flutter. He also received multiple doses of lopressors. Currently his repeat ekg and telemetry still shows on wide complex tachycardia in 140s even being on max dose of diltiazem drop. He has also been started on heparin drip due to elevated troponin level of 0.49. Currently repeat ekg and tele reveals wide complex tachycardia Past Medical History:  
Diagnosis Date  Cardiac catheterization 03/11/2009 LM mild. p/mLAD 70%. mLAD stent patent. oD 85%. CX 30%. mRCA 35%. p/dRCA stents patent. RPLB 30%. Unsuccessful PTCA of diagonal lesion.  Cardiac echocardiogram 03/10/2009 Tech. difficult. EF 50%. Mild apical hypk. Mod increased ventricular septal wall thickness. Mild LAE. Mild-mod AI.  Cardiac Holter monitor 01/28/2015 Baseline bradycardia w/bundle branch block. Max HR of 91 bpm may indicate chronotropic incompetence. Patient's events mainly corresponded to PVCs w/ventricular trigeminy. PVC burden <1% in 48-hour study.  Cardiac nuclear imaging test 08/04/2014 No ischemia or prior infarction. EF 62%. No RWMA. Nondiagnostic EKG on pharm stress test.  Low risk.  Coronary artery disease MI and stents placed approx 2012  Dyslipidemia  Hypertension  PACs  Sleep apnea   
 uses cpap  Stroke [facility])   
 '04  residual R paraesthesias Past Surgical History:  
Procedure Laterality Date  COLONOSCOPY N/A 10/18/2016 COLONOSCOPY and polypectomies performed by Joey Beltran MD at [facility] 112  2012 (approx) 2 stents  SHOULDER SURG PROC UNLISTED    
 '99 Family History Problem Relation Age of Onset  Heart Failure Mother  Diabetes Mother  Stroke Father  Diabetes Father Social History Socioeconomic History  Marital status:  Spouse name: Not on file  Number of children: Not on file  Years of education: Not on file  Highest education level: Not on file Tobacco Use  Smoking status: Never Smoker  Smokeless tobacco: Never Used Substance and Sexual Activity  Alcohol use: Yes Comment: beer occasionally  Drug use: No  
 
 
Prior to Admission medications Medication Sig Start Date End Date Taking? Authorizing Provider  
nitroglycerin (NITROSTAT) 0.4 mg SL tablet DISSOLVE 1 TABLET UNDER THE TONGUE  EVERY 5 MINUTES AS NEEDED FOR CHEST PAIN 12/17/18   Ana Lilia Christensen MD  
TURMERIC PO Take  by mouth.     Provider, Historical  
 lisinopril (PRINIVIL, ZESTRIL) 5 mg tablet TAKE 1 TABLET DAILY 18   Santos Souza MD  
clopidogrel (PLAVIX) 75 mg tab TAKE 1 TABLET DAILY 18   Santos Souza MD  
traMADol Darfadi Herder) 50 mg tablet Take 50 mg by mouth every six (6) hours as needed for Pain. Provider, Historical  
VITAMIN B COMPLEX (B-100 COMPLEX PO) Take  by mouth. Provider, Historical  
UBIDECARENONE/VITAMIN E MIXED (COQ10  PO) Take  by mouth daily. Provider, Historical  
simvastatin (ZOCOR) 20 mg tablet Take 20 mg by mouth nightly. Provider, Historical  
aspirin 81 mg tablet Take 81 mg by mouth daily. Provider, Historical  
omega-3 acid ethyl esters (LOVAZA) 1 gram capsule Take 2 g by mouth two (2) times a day. Provider, Historical  
clonazepam (KLONOPIN) 1 mg tablet Take 0.5 mg by mouth daily. Provider, Historical  
 
 
No Known Allergies Review of Systems 10 point review of systems are negative except as mentioned above Physical Exam:  
 
Physical Exam: 
Visit Vitals /80 Pulse (!) 140 Temp 97.7 °F (36.5 °C) Resp 19 Wt 77.1 kg (170 lb) SpO2 97% BMI 25.85 kg/m² O2 Device: Room air Temp (24hrs), Av °F (36.7 °C), Min:97.7 °F (36.5 °C), Max:98.5 °F (36.9 °C) 
   1901 -  0700 In: 15 [I.V.:15] Out: -    No intake/output data recorded. General:  Alert, cooperative, no distress, appears stated age. Head: Normocephalic, without obvious abnormality, atraumatic. Eyes:  Conjunctivae/corneas clear. PERRL, EOMs intact. Nose: Nares normal. No drainage or sinus tenderness. Neck: Supple, symmetrical, trachea midline, no adenopathy, thyroid: no enlargement, no carotid bruit and no JVD. Lungs:   Clear to auscultation bilaterally. Heart:  Tachycardia  S1, S2 normal.  
  Abdomen: Soft, non-tender. Bowel sounds normal.   
Extremities: Extremities normal, atraumatic, no cyanosis or edema. Pulses: 2+ and symmetric all extremities. Skin:  No rashes or lesions Neurologic: AAOx3, No focal motor or sensory deficit. Labs Reviewed: 
 
Recent Results (from the past 24 hour(s)) EKG, 12 LEAD, INITIAL Collection Time: 02/05/19  5:58 PM  
Result Value Ref Range Ventricular Rate 144 BPM  
 Atrial Rate 144 BPM  
 QRS Duration 146 ms  
 Q-T Interval 360 ms QTC Calculation (Bezet) 557 ms Calculated R Axis -16 degrees Calculated T Axis -168 degrees Diagnosis Wide QRS tachycardia with fusion complexes Left bundle branch block Abnormal ECG When compared with ECG of 30-MAY-2017 09:21, Wide QRS tachycardia has replaced Sinus rhythm Vent. rate has increased BY  94 BPM 
  
CBC WITH AUTOMATED DIFF Collection Time: 02/05/19  6:28 PM  
Result Value Ref Range WBC 9.8 4.6 - 13.2 K/uL  
 RBC 4.92 4.70 - 5.50 M/uL  
 HGB 15.2 13.0 - 16.0 g/dL HCT 42.7 36.0 - 48.0 % MCV 86.8 74.0 - 97.0 FL  
 MCH 30.9 24.0 - 34.0 PG  
 MCHC 35.6 31.0 - 37.0 g/dL  
 RDW 13.1 11.6 - 14.5 % PLATELET 830 876 - 666 K/uL MPV 10.3 9.2 - 11.8 FL  
 NEUTROPHILS 61 40 - 73 % LYMPHOCYTES 28 21 - 52 % MONOCYTES 9 3 - 10 % EOSINOPHILS 2 0 - 5 % BASOPHILS 0 0 - 2 %  
 ABS. NEUTROPHILS 6.0 1.8 - 8.0 K/UL  
 ABS. LYMPHOCYTES 2.8 0.9 - 3.6 K/UL  
 ABS. MONOCYTES 0.9 0.05 - 1.2 K/UL  
 ABS. EOSINOPHILS 0.2 0.0 - 0.4 K/UL  
 ABS. BASOPHILS 0.0 0.0 - 0.1 K/UL  
 DF AUTOMATED METABOLIC PANEL, COMPREHENSIVE Collection Time: 02/05/19  6:28 PM  
Result Value Ref Range Sodium 141 136 - 145 mmol/L Potassium 3.7 3.5 - 5.5 mmol/L Chloride 108 100 - 108 mmol/L  
 CO2 26 21 - 32 mmol/L Anion gap 7 3.0 - 18 mmol/L Glucose 117 (H) 74 - 99 mg/dL BUN 24 (H) 7.0 - 18 MG/DL Creatinine 1.36 (H) 0.6 - 1.3 MG/DL  
 BUN/Creatinine ratio 18 12 - 20 GFR est AA >60 >60 ml/min/1.73m2 GFR est non-AA 51 (L) >60 ml/min/1.73m2 Calcium 8.4 (L) 8.5 - 10.1 MG/DL  Bilirubin, total 0.8 0.2 - 1.0 MG/DL  
 ALT (SGPT) 35 16 - 61 U/L  
 AST (SGOT) 32 15 - 37 U/L Alk. phosphatase 56 45 - 117 U/L Protein, total 7.0 6.4 - 8.2 g/dL Albumin 4.1 3.4 - 5.0 g/dL Globulin 2.9 2.0 - 4.0 g/dL A-G Ratio 1.4 0.8 - 1.7 CARDIAC PANEL,(CK, CKMB & TROPONIN) Collection Time: 02/05/19  6:28 PM  
Result Value Ref Range  39 - 308 U/L  
 CK - MB 6.3 (H) <3.6 ng/ml CK-MB Index 4.3 (H) 0.0 - 4.0 % Troponin-I, QT 0.49 (H) 0.0 - 0.045 NG/ML  
MAGNESIUM Collection Time: 02/05/19  6:28 PM  
Result Value Ref Range Magnesium 2.3 1.6 - 2.6 mg/dL EKG, 12 LEAD, SUBSEQUENT Collection Time: 02/05/19  8:26 PM  
Result Value Ref Range Ventricular Rate 143 BPM  
 Atrial Rate 147 BPM  
 QRS Duration 138 ms Q-T Interval 364 ms QTC Calculation (Bezet) 561 ms Calculated R Axis -53 degrees Calculated T Axis 168 degrees Diagnosis Wide QRS tachycardia Left axis deviation Left bundle branch block Abnormal ECG When compared with ECG of 30-MAY-2017 09:21, Wide QRS tachycardia has replaced Sinus rhythm Vent. rate has increased BY  93 BPM 
  
CBC WITH AUTOMATED DIFF Collection Time: 02/05/19  9:30 PM  
Result Value Ref Range WBC 8.3 4.6 - 13.2 K/uL  
 RBC 4.74 4.70 - 5.50 M/uL  
 HGB 14.2 13.0 - 16.0 g/dL HCT 40.8 36.0 - 48.0 % MCV 86.1 74.0 - 97.0 FL  
 MCH 30.0 24.0 - 34.0 PG  
 MCHC 34.8 31.0 - 37.0 g/dL  
 RDW 13.0 11.6 - 14.5 % PLATELET 457 397 - 623 K/uL MPV 10.1 9.2 - 11.8 FL  
 NEUTROPHILS 55 40 - 73 % LYMPHOCYTES 33 21 - 52 % MONOCYTES 9 3 - 10 % EOSINOPHILS 3 0 - 5 % BASOPHILS 0 0 - 2 %  
 ABS. NEUTROPHILS 4.6 1.8 - 8.0 K/UL  
 ABS. LYMPHOCYTES 2.7 0.9 - 3.6 K/UL  
 ABS. MONOCYTES 0.8 0.05 - 1.2 K/UL  
 ABS. EOSINOPHILS 0.3 0.0 - 0.4 K/UL  
 ABS. BASOPHILS 0.0 0.0 - 0.1 K/UL  
 DF AUTOMATED    
PTT Collection Time: 02/05/19  9:30 PM  
Result Value Ref Range aPTT 30.3 23.0 - 36.4 SEC  
TSH 3RD GENERATION Collection Time: 02/06/19 12:39 AM  
Result Value Ref Range TSH 5.92 (H) 0.36 - 3.74 uIU/mL CARDIAC PANEL,(CK, CKMB & TROPONIN) Collection Time: 02/06/19 12:39 AM  
Result Value Ref Range  39 - 308 U/L  
 CK - MB 5.0 (H) <3.6 ng/ml CK-MB Index 4.6 (H) 0.0 - 4.0 % Troponin-I, QT 0.50 (H) 0.0 - 0.045 NG/ML  
 
CT Results  (Last 48 hours) None CXR Results  (Last 48 hours) 02/05/19 1825  XR CHEST PORT Final result Impression:  IMPRESSION:  
   
No clearly acute findings. Underpenetration slightly limits evaluation. Narrative:  EXAMINATION: Chest single view INDICATION: Chest pain, shortness of breath COMPARISON: CT 11/20/2017 FINDINGS: Single frontal view of the chest obtained. Underpenetration limits  
evaluation. No consolidation. Mediastinal silhouette and pulmonary vasculature  
unremarkable. No evidence of pneumothorax. No acute osseous findings. Procedures/imaging: see electronic medical records for all procedures/Xrays and details which were not copied into this note but were reviewed prior to creation of Plan Assessment/Plan  
 
70year old male with past medical history of HTN, HLD, CVA, CAD with stents and known LBBB presented with with rapid heart rate while being seen by [facility] today. EKG revealed wide complex tachycardia, LBBB. In emergency he room het was given Diltiazem 20 mg bolus as well as diltiazem infusion with no change in heart rate. Given this, decided to administer adenosine to ensure atrial flutter with aberrancy vs. V-tach. With administration of 6 mg adenosine, rhythm strip showed saw-tooth pattern consistent with atrial flutter. Currently repeat ekg and tele reveals wide complex tachycardia Wide complex tachycardia Atrial flutter after receiving adenosine Plan: 
Admit to step down unit. Continue with diltiazem infusion at max rate Given no significant improvement in HR would start with amiodarone drip after bolus Follow up cardio consult NSTEMI likely type II MI due to demand ischemia Plan: 
Heparin infusion ASA/plavix/Zocor Chronic medical conditions HTN, CAD with stents, HLD and CVA C/w home medications DVT/GI Prophylaxis: heparin infusion Cardiac diet Discussed with patient at bedside about hospital admission and my plan care, both understood and agree with my plan care. Ronni Mosher MD 
2/6/2019 2:07 AM 
 
Addendum: echo is ordered

## 2019-02-06 NOTE — ROUTINE PROCESS
Bedside change of shift report received from [facility] AND [facility]. Patient greeted / introduced myself as their primary nurse. Encouraged to voice any concerns, and all questions/concerns addressed. Explanation and teaching of all care given, including any pending orders or procedures. Call bell with reach. Patient fall risk assessed, with prevention measures in place, to include bed in lowest position with casters locked and rail up, call bell within reach, frequent toileting in progress, lights on, pathway to bathroom free from obstacles. Patient instructed to call for assist OOB at all times. Instructed that staff will make hourly rounds to provide reassessments in pain control, concerns, toileting, and any other updates in care. Hand hygiene maintained prior to and after patient/staff interaction.

## 2019-02-06 NOTE — ED NOTES
Talked to Dr. Yajaira Galan about patient going to stepdown, with his HR being high and he said \" she can go to stepdown\"

## 2019-02-06 NOTE — ROUTINE PROCESS
TRANSFER - IN REPORT: 
 
Verbal report received from Alis Pate RN(name[facility]  being received from ED(unit) for routine progression of care Report consisted of patients Situation, Background, Assessment and  
Recommendations(SBAR). Information from the following report(s) SBAR, ED Summary, Intake/Output, MAR and Cardiac Rhythm AFlutter with RVR was reviewed with the receiving nurse. Opportunity for questions and clarification was provided. Assessment to be completed upon patients arrival to unit and care to be assumed.

## 2019-02-06 NOTE — PROGRESS NOTES
met with patient while he was in the ICU, completed the initial Spiritual Assessment of the patient, and offered Pastoral Care, see flow sheets for interventions. He was sitting up and eating dinner. His wife was visiting. Pastoral support provided. He indicated he is doing all right. Patient does not have any Yarsani/cultural needs that will affect patients preferences in health care. Chart reviewed. Chaplains will continue to follow and will provide pastoral care on an as needed/as requested basis. Neymar Bartlett MDiv. Board Certified [employer] Office [phone]

## 2019-02-06 NOTE — ED NOTES
Talked to Dr. Roslyn Mcclure about patient cardizem drip, and he stated \"not to continue it at this time\"

## 2019-02-06 NOTE — CONSULTS
[facility] - Consult Note Consultation request by Yenni hCavez MD for advice/opinion related to evaluating Atrial flutter with rapid ventricular response ([facility] [address] [I48.92] Date of  Admission: 2/5/2019  5:54 PM  
Primary Care Physician:  Sarwat Milner MD 
 
 Assessment:  
 
Patient Active Problem List  
Diagnosis Code  Hypertension I11.9  Dyslipidemia E78.5  Coronary artery disease I25.10  
 LBBB (left bundle branch block) I44.7  PVC's (premature ventricular contractions) I49.3  Sinus bradycardia, chronic R00.1  Atherosclerosis of native coronary artery of native heart without angina pectoris I25.10  Atrial flutter with rapid ventricular response (HCC) I48.92  
 
 
-Atrial flutter 2:1 block, new diagnosis at PCP office yesterday, with underlying LBBB. Remains in atrial flutter but HR improved on amio and cardizem drip. 
-Indeterminate troponin, suspect secondary demand ischemia in setting of tachycardia, ECG with chronic LBBB. -Recent GI illness with nausea, vomiting, diarrhea. -CAD history of multivessel stenting in 2009 with a drug-eluting stent to his mid LAD and RPDA. He had a bare-metal stent to his proximal RCA. He has been maintained on aspirin and Plavix since that time. He also remains on simvastatin. He was unable to tolerate a beta blocker due to to bradycardia. He last underwent a pharmacological nuclear stress test in May 2017 which did not show any significant ischemia. Echo with EF 50% with mild apical hypokinesis 2009. -Hypertension. Stable. -Dyslipidemia. On statin. 
-Chronic left bundle branch block. 
-History of CVA. Primary Cardiologist Dr. Kishan Guillen Plan: Will continue amiodarone and cardizem drip. Will continue heparin infusion. If unable to rate control or convert today, will discuss possible JIGAR/cardioversion or EP study/ablation tomorrow. Will get echocardiogram once HR stable. History of Present Illness: This is a 70 y.o. male admitted for Atrial flutter with rapid ventricular response ([facility] [address] [I48.92]. Patient complains of:  SOB, tachycardia. Patient is an active 70year old male with history of CAD, HTN, Lipids who presented to PCP yesterday with SOB and fatigue following a recent GI illness with N/V/D. Patient denies CP, palpitations, syncope. Patient was found to have atrial flutter with 2:1 block. Patient was sent to ER. Patients HR difficult to control despite cardizem and IV lopressor. Patient was started on amio infusion. HR with some improvement now about 120. Patient without complaints. BP stable. Cardiac risk factors: dyslipidemia, male gender, hypertension Review of Symptoms:   
Constitutional: negative for fevers Eyes: negative for visual disturbance Ears, nose, mouth, throat, and face: negative for nasal congestion Respiratory: negative for cough Cardiovascular: positive for dyspnea, fatigue, negative for chest pain, palpitations, syncope Gastrointestinal: positive for nausea, vomiting and diarrhea Genitourinary:negative for dysuria Hematologic/lymphatic: negative for bleeding Musculoskeletal:negative for muscle weakness Neurological: negative for dizziness Past Medical History:  
 
Past Medical History:  
Diagnosis Date  Cardiac catheterization 03/11/2009 LM mild. p/mLAD 70%. mLAD stent patent. oD 85%. CX 30%. mRCA 35%. p/dRCA stents patent. RPLB 30%. Unsuccessful PTCA of diagonal lesion.  Cardiac echocardiogram 03/10/2009 Tech. difficult. EF 50%. Mild apical hypk. Mod increased ventricular septal wall thickness. Mild LAE. Mild-mod AI.  Cardiac Holter monitor 01/28/2015 Baseline bradycardia w/bundle branch block. Max HR of 91 bpm may indicate chronotropic incompetence. Patient's events mainly corresponded to PVCs w/ventricular trigeminy. PVC burden <1% in 48-hour study.  Cardiac nuclear imaging test 08/04/2014 No ischemia or prior infarction. EF 62%. No RWMA. Nondiagnostic EKG on pharm stress test.  Low risk.  Coronary artery disease MI and stents placed approx 2012  Dyslipidemia  Hypertension  PACs  Sleep apnea   
 uses cpap  Stroke [facility])   
 '04  residual R paraesthesias Social History:  
 
Social History Socioeconomic History  Marital status:  Spouse name: Not on file  Number of children: Not on file  Years of education: Not on file  Highest education level: Not on file Tobacco Use  Smoking status: Never Smoker  Smokeless tobacco: Never Used Substance and Sexual Activity  Alcohol use: Yes Comment: beer occasionally  Drug use: No  
 
 
 Family History:  
 
Family History Problem Relation Age of Onset  Heart Failure Mother  Diabetes Mother  Stroke Father  Diabetes Father Medications:  
No Known Allergies Current Facility-Administered Medications Medication Dose Route Frequency  aspirin chewable tablet 81 mg  81 mg Oral DAILY  clonazePAM (KlonoPIN) tablet 0.5 mg  0.5 mg Oral DAILY  clopidogrel (PLAVIX) tablet 75 mg  75 mg Oral DAILY  [START ON 2/7/2019] lisinopril (PRINIVIL, ZESTRIL) tablet 5 mg  5 mg Oral DAILY  omega-3 acid ethyl esters (LOVAZA) capsule 2,000 mg  2 g Oral BID  simvastatin (ZOCOR) tablet 20 mg  20 mg Oral QHS  traMADol (ULTRAM) tablet 50 mg  50 mg Oral Q6H PRN  
 amiodarone (NEXTERONE) 360 mg in dextrose 200 mL (1.8 mg/mL) infusion  0.5-1 mg/min IntraVENous TITRATE  
 0.9% sodium chloride infusion  75 mL/hr IntraVENous CONTINUOUS  
 dilTIAZem (CARDIZEM) 100 mg in 0.9% sodium chloride (MBP/ADV) 100 mL infusion  5-15 mg/hr IntraVENous TITRATE  heparin 25,000 units in D5W 250 ml infusion  18-36 Units/kg/hr IntraVENous TITRATE Physical Exam:  
 
Visit Vitals /74 Pulse (!) 120 Temp 98.1 °F (36.7 °C) Resp 12 Ht 5' 8\" (1.727 m) Wt 77.1 kg (170 lb) SpO2 99% BMI 25.85 kg/m² BP Readings from Last 3 Encounters:  
02/06/19 110/74  
11/12/18 132/66  
05/07/18 108/60 Pulse Readings from Last 3 Encounters:  
02/06/19 (!) 120  
11/12/18 60  
05/07/18 (!) 51 Wt Readings from Last 3 Encounters:  
02/05/19 77.1 kg (170 lb)  
11/12/18 78 kg (172 lb) 05/07/18 78.9 kg (174 lb) General:  alert, cooperative, no distress, appears stated age Neck:  no JVD Lungs:  clear to auscultation bilaterally Heart:  irregularly irregular rhythm Abdomen:  abdomen is soft without significant tenderness, masses, organomegaly or guarding Extremities:  extremities normal, atraumatic, no cyanosis or edema Skin: Warm and dry. no hyperpigmentation, vitiligo, or suspicious lesions Neuro: alert, oriented x3, affect appropriate Psych: non focal 
 
 Data Review:  
 
Recent Labs 02/06/19 
0349 02/05/19 
2130 02/05/19 
1828 WBC 9.2 8.3 9.8 HGB 14.2 14.2 15.2 HCT 39.4 40.8 42.7  202 221 Recent Labs 02/06/19 
0349 02/05/19 
1828  141  
K 3.4* 3.7 * 108 CO2 25 26 * 117* BUN 19* 24* CREA 1.09 1.36* CA 7.7* 8.4* MG  --  2.3 ALB 3.4 4.1 SGOT 33 32 ALT 37 35 Results for orders placed or performed during the hospital encounter of 02/05/19 EKG, 12 LEAD, INITIAL Result Value Ref Range Ventricular Rate 140 BPM  
 Atrial Rate 24 BPM  
 QRS Duration 144 ms Q-T Interval 366 ms  
 QTC Calculation (Bezet) 558 ms Calculated R Axis -10 degrees Calculated T Axis -157 degrees Diagnosis Wide QRS tachycardia Left bundle branch block Abnormal ECG When compared with ECG of 05-FEB-2019 20:26, No significant change was found Results for orders placed or performed in visit on 11/12/18 AMB POC EKG ROUTINE W/ 12 LEADS, INTER & REP Impression See progress note. Results for orders placed or performed during the hospital encounter of 01/28/15 ECG HOLTER MONITOR, UP TO 48 HRS Narrative Holter Monitoring Report [address] [facility] [address], [address] Test Date:    2015 Pat Name:     Yunior Lorenzo             Department:    
Patient ID:                 Room:          
Gender:       MALE                     Technician:   chito :                         Requested By: Christian White MD 
Order Number:                          Reading MD:   Candy Saenz MD 
                           Interpretive Statements Miko Howard was in [place] [address]. The average heart rate, excluding ectopy, was 49 BPM with a minimum of 36 BPM 
at  05:45D2 and a maximum of 91 BPM at   13:18D2. Heart beats, including ectopy, totaled 029321 beats. VENTRICULAR ECTOPICS totaled 3030  averaging  65.8 per hour  with 2044 
single, 0 paired, 16 trigeminy and 0 R on T.  BIGEMINY runs occurred 199 
times and totaled 970 beats. SUPRAVENTRICULAR ECTOPICS totaled 3  ,with 3 single and 0 paired beats. 1. Rhythm is sinus. 2. VT and QRS are within normal limits. 3. () single ve''s, bigeminy and trigeminy noted. 4. (3) single sve''s. Summary: Abnormal 48-hour Holter monitor study demonstrated baseline sinus 
bradycardia with an underlying bundle branch block. There was a maximal heart 
rate of only 91 and therefore some component of chronotropic incompetence has 
to be considered. The patient's events were for the most part associated with 
frequent PVCs with ventricular trigeminy. The overall PVC burden, however, in 
48 hours was less than 1%. Electronically signed on 02-04-15 09:57:45 CST by Candy Saenz MD  
 
 
All Cardiac Markers in the last 24 hours:   
Lab Results Component Value Date/Time  CPK 94 2019 03:49 AM  
  2019 12:39 AM  
  2019 06:28 PM  
 CKMB 4.3 (H) 02/06/2019 03:49 AM  
 CKMB 5.0 (H) 02/06/2019 12:39 AM  
 CKMB 6.3 (H) 02/05/2019 06:28 PM  
 CKND1 4.6 (H) 02/06/2019 03:49 AM  
 CKND1 4.6 (H) 02/06/2019 12:39 AM  
 CKND1 4.3 (H) 02/05/2019 06:28 PM  
 TROIQ 0.38 (H) 02/06/2019 03:49 AM  
 TROIQ 0.50 (H) 02/06/2019 12:39 AM  
 TROIQ 0.49 (H) 02/05/2019 06:28 PM  
 
 
Last Lipid:   
Lab Results Component Value Date/Time Cholesterol, total 150 09/08/2010 12:00 PM  
 HDL Cholesterol 56 09/08/2010 12:00 PM  
 LDL, calculated 78 09/08/2010 12:00 PM  
 Triglyceride 80 09/08/2010 12:00 PM  
 CHOL/HDL Ratio 2.7 09/08/2010 12:00 PM  
 
 
Signed By: AYALA Mendoza February 6, 2019

## 2019-02-06 NOTE — PROGRESS NOTES
[facility] Progress Note Patient: Gloria Darling. Age: 70 y.o. : 1940 MR#: [id number] SSN: [id number] Date/Time: 2019 11:54 AM 
 
Subjective/24-hour events:  
 
Denies chest pain and SOB acutely. HRs improved but continue to be somewhat labile on monitor. Assessment: Wide complex tachycardia Atrial flutter with 2:1 block HTN Dyslipidemia CAD Cerebrovascular disease with history of CVA Plan: 
Continue heparin, amiodarone and cardizem per cardiology. Further cardiac management pending response to above. Possible cardioversion versus EP study tomorrow. Echo when HRs better. Continue lisinopril/simvastatin/asa. Follow. Maintain in ICU on stepdown status given continued need for multiple infusions. Hadley De La Fuente Case discussed with:  [x]Patient  []Family  []Nursing  []Case Management DVT Prophylaxis:  []Lovenox  []Hep SQ  []SCDs  []Coumadin   [x]On Heparin gtt Objective:  
VS:  
Visit Vitals /78 Pulse 103 Temp 98.1 °F (36.7 °C) Resp 17 Ht 5' 8\" (1.727 m) Wt 77.1 kg (170 lb) SpO2 100% BMI 25.85 kg/m² Tmax/24hrs: Temp (24hrs), Av °F (36.7 °C), Min:97.7 °F (36.5 °C), Max:98.5 °F (36.9 °C) General:  In AM. Cardiovascular:  S1, S2.  Variably tachycardic. Pulmonary:  Clear, no wheezes. Effort nonlabored. GI:  Abdomen soft, NTTP. Extremities:  Warm, no ischemia. Neuro:  Awake and alert, motor nonfocal. 
 
Labs:   
Recent Results (from the past 24 hour(s)) CBC WITH AUTOMATED DIFF Collection Time: 19  6:28 PM  
Result Value Ref Range WBC 9.8 4.6 - 13.2 K/uL  
 RBC 4.92 4.70 - 5.50 M/uL  
 HGB 15.2 13.0 - 16.0 g/dL HCT 42.7 36.0 - 48.0 % MCV 86.8 74.0 - 97.0 FL  
 MCH 30.9 24.0 - 34.0 PG  
 MCHC 35.6 31.0 - 37.0 g/dL  
 RDW 13.1 11.6 - 14.5 % PLATELET 362 256 - 960 K/uL MPV 10.3 9.2 - 11.8 FL  
 NEUTROPHILS 61 40 - 73 % LYMPHOCYTES 28 21 - 52 % MONOCYTES 9 3 - 10 % EOSINOPHILS 2 0 - 5 % BASOPHILS 0 0 - 2 %  
 ABS. NEUTROPHILS 6.0 1.8 - 8.0 K/UL  
 ABS. LYMPHOCYTES 2.8 0.9 - 3.6 K/UL  
 ABS. MONOCYTES 0.9 0.05 - 1.2 K/UL  
 ABS. EOSINOPHILS 0.2 0.0 - 0.4 K/UL  
 ABS. BASOPHILS 0.0 0.0 - 0.1 K/UL  
 DF AUTOMATED METABOLIC PANEL, COMPREHENSIVE Collection Time: 02/05/19  6:28 PM  
Result Value Ref Range Sodium 141 136 - 145 mmol/L Potassium 3.7 3.5 - 5.5 mmol/L Chloride 108 100 - 108 mmol/L  
 CO2 26 21 - 32 mmol/L Anion gap 7 3.0 - 18 mmol/L Glucose 117 (H) 74 - 99 mg/dL BUN 24 (H) 7.0 - 18 MG/DL Creatinine 1.36 (H) 0.6 - 1.3 MG/DL  
 BUN/Creatinine ratio 18 12 - 20 GFR est AA >60 >60 ml/min/1.73m2 GFR est non-AA 51 (L) >60 ml/min/1.73m2 Calcium 8.4 (L) 8.5 - 10.1 MG/DL Bilirubin, total 0.8 0.2 - 1.0 MG/DL  
 ALT (SGPT) 35 16 - 61 U/L  
 AST (SGOT) 32 15 - 37 U/L Alk. phosphatase 56 45 - 117 U/L Protein, total 7.0 6.4 - 8.2 g/dL Albumin 4.1 3.4 - 5.0 g/dL Globulin 2.9 2.0 - 4.0 g/dL A-G Ratio 1.4 0.8 - 1.7 CARDIAC PANEL,(CK, CKMB & TROPONIN) Collection Time: 02/05/19  6:28 PM  
Result Value Ref Range  39 - 308 U/L  
 CK - MB 6.3 (H) <3.6 ng/ml CK-MB Index 4.3 (H) 0.0 - 4.0 % Troponin-I, QT 0.49 (H) 0.0 - 0.045 NG/ML  
MAGNESIUM Collection Time: 02/05/19  6:28 PM  
Result Value Ref Range Magnesium 2.3 1.6 - 2.6 mg/dL EKG, 12 LEAD, SUBSEQUENT Collection Time: 02/05/19  8:26 PM  
Result Value Ref Range Ventricular Rate 143 BPM  
 Atrial Rate 147 BPM  
 QRS Duration 138 ms Q-T Interval 364 ms QTC Calculation (Bezet) 561 ms Calculated R Axis -53 degrees Calculated T Axis 168 degrees Diagnosis Wide QRS tachycardia , possible atrial flutter Left axis deviation Left bundle branch block Abnormal ECG Confirmed by Abril Hall MD, Colletta Kapur ([id number]) on 2/6/2019 8:12:15 AM 
  
CBC WITH AUTOMATED DIFF Collection Time: 02/05/19  9:30 PM  
Result Value Ref Range WBC 8.3 4.6 - 13.2 K/uL  
 RBC 4.74 4.70 - 5.50 M/uL  
 HGB 14.2 13.0 - 16.0 g/dL HCT 40.8 36.0 - 48.0 % MCV 86.1 74.0 - 97.0 FL  
 MCH 30.0 24.0 - 34.0 PG  
 MCHC 34.8 31.0 - 37.0 g/dL  
 RDW 13.0 11.6 - 14.5 % PLATELET 129 233 - 815 K/uL MPV 10.1 9.2 - 11.8 FL  
 NEUTROPHILS 55 40 - 73 % LYMPHOCYTES 33 21 - 52 % MONOCYTES 9 3 - 10 % EOSINOPHILS 3 0 - 5 % BASOPHILS 0 0 - 2 %  
 ABS. NEUTROPHILS 4.6 1.8 - 8.0 K/UL  
 ABS. LYMPHOCYTES 2.7 0.9 - 3.6 K/UL  
 ABS. MONOCYTES 0.8 0.05 - 1.2 K/UL  
 ABS. EOSINOPHILS 0.3 0.0 - 0.4 K/UL  
 ABS. BASOPHILS 0.0 0.0 - 0.1 K/UL  
 DF AUTOMATED    
PTT Collection Time: 02/05/19  9:30 PM  
Result Value Ref Range aPTT 30.3 23.0 - 36.4 SEC  
TSH 3RD GENERATION Collection Time: 02/06/19 12:39 AM  
Result Value Ref Range TSH 5.92 (H) 0.36 - 3.74 uIU/mL CARDIAC PANEL,(CK, CKMB & TROPONIN) Collection Time: 02/06/19 12:39 AM  
Result Value Ref Range  39 - 308 U/L  
 CK - MB 5.0 (H) <3.6 ng/ml CK-MB Index 4.6 (H) 0.0 - 4.0 % Troponin-I, QT 0.50 (H) 0.0 - 0.045 NG/ML  
EKG, 12 LEAD, INITIAL Collection Time: 02/06/19  1:24 AM  
Result Value Ref Range Ventricular Rate 140 BPM  
 Atrial Rate 24 BPM  
 QRS Duration 144 ms Q-T Interval 366 ms  
 QTC Calculation (Bezet) 558 ms Calculated R Axis -10 degrees Calculated T Axis -157 degrees Diagnosis Wide QRS tachycardia Left bundle branch block Abnormal ECG When compared with ECG of 05-FEB-2019 20:26, No significant change was found Confirmed by Kamille James MD, Carmina Neat ([id number]) on 2/6/2019 2:22:55 PM 
  
PTT Collection Time: 02/06/19  3:49 AM  
Result Value Ref Range aPTT >180.0 (HH) 23.0 - 36.4 SEC METABOLIC PANEL, COMPREHENSIVE Collection Time: 02/06/19  3:49 AM  
Result Value Ref Range Sodium 141 136 - 145 mmol/L Potassium 3.4 (L) 3.5 - 5.5 mmol/L Chloride 110 (H) 100 - 108 mmol/L  
 CO2 25 21 - 32 mmol/L  Anion gap 6 3.0 - 18 mmol/L  
 Glucose 122 (H) 74 - 99 mg/dL BUN 19 (H) 7.0 - 18 MG/DL Creatinine 1.09 0.6 - 1.3 MG/DL  
 BUN/Creatinine ratio 17 12 - 20 GFR est AA >60 >60 ml/min/1.73m2 GFR est non-AA >60 >60 ml/min/1.73m2 Calcium 7.7 (L) 8.5 - 10.1 MG/DL Bilirubin, total 0.9 0.2 - 1.0 MG/DL  
 ALT (SGPT) 37 16 - 61 U/L  
 AST (SGOT) 33 15 - 37 U/L Alk. phosphatase 50 45 - 117 U/L Protein, total 6.0 (L) 6.4 - 8.2 g/dL Albumin 3.4 3.4 - 5.0 g/dL Globulin 2.6 2.0 - 4.0 g/dL A-G Ratio 1.3 0.8 - 1.7    
CBC WITH AUTOMATED DIFF Collection Time: 02/06/19  3:49 AM  
Result Value Ref Range WBC 9.2 4.6 - 13.2 K/uL  
 RBC 4.57 (L) 4.70 - 5.50 M/uL  
 HGB 14.2 13.0 - 16.0 g/dL HCT 39.4 36.0 - 48.0 % MCV 86.2 74.0 - 97.0 FL  
 MCH 31.1 24.0 - 34.0 PG  
 MCHC 36.0 31.0 - 37.0 g/dL  
 RDW 13.1 11.6 - 14.5 % PLATELET 679 062 - 511 K/uL MPV 10.1 9.2 - 11.8 FL  
 NEUTROPHILS 54 40 - 73 % LYMPHOCYTES 34 21 - 52 % MONOCYTES 7 3 - 10 % EOSINOPHILS 5 0 - 5 % BASOPHILS 0 0 - 2 %  
 ABS. NEUTROPHILS 5.0 1.8 - 8.0 K/UL  
 ABS. LYMPHOCYTES 3.2 0.9 - 3.6 K/UL  
 ABS. MONOCYTES 0.6 0.05 - 1.2 K/UL  
 ABS. EOSINOPHILS 0.4 0.0 - 0.4 K/UL  
 ABS. BASOPHILS 0.0 0.0 - 0.1 K/UL  
 DF AUTOMATED CARDIAC PANEL,(CK, CKMB & TROPONIN) Collection Time: 02/06/19  3:49 AM  
Result Value Ref Range CK 94 39 - 308 U/L  
 CK - MB 4.3 (H) <3.6 ng/ml CK-MB Index 4.6 (H) 0.0 - 4.0 % Troponin-I, QT 0.38 (H) 0.0 - 0.045 NG/ML  
PTT Collection Time: 02/06/19  9:50 AM  
Result Value Ref Range aPTT 140.4 (H) 23.0 - 36.4 SEC Signed By: Tj Martínez MD   
 February 6, 2019 11:54 AM

## 2019-02-06 NOTE — ROUTINE PROCESS
Bedside shift change report given to [address] (oncoming nurse) by Alicia Valles RN and Lindy ACNDELARIA (offgoing nurse). Report included the following information SBAR, ED Summary, Intake/Output, MAR, Recent Results and Cardiac Rhythm A Flutter with RVR.

## 2019-02-06 NOTE — ED NOTES
Patient complained of his \"balls feeling warm, like with IV contrast\"  Patient reexamined no swelling or redness noted will continue to monitor

## 2019-02-07 NOTE — PROGRESS NOTES
[facility] Progress Note Patient: Yonis Keller. Age: 70 y.o. : 1940 MR#: [id number] SSN: [id number] Date/Time: 2019 10:18 AM 
 
Subjective/24-hour events: No new complaints - no palpitations or chest pain, denies SOB. Assessment:  
Atrial flutter with RVR and 2:1 block, now NSR Cardiomyopathy, EF 38% via echo HTN Dyslipidemia CAD s/p stents Cerebrovascular disease with history of CVA Plan: 
PO amiodarone, continue telemetry monitoring. IV heparin for now. Further plan for [address] therapy going forward TBD. ASA/Plavix/lisinopril/omega-3. Further plan pending response to current antiarrhythmic therapy/additional cardiology recommendations. Case discussed with:  [x]Patient  []Family  []Nursing  []Case Management DVT Prophylaxis:  []Lovenox  []Hep SQ  []SCDs  []Coumadin   [x]On Heparin gtt Objective:  
VS:  
Visit Vitals /79 Pulse 63 Temp 98.3 °F (36.8 °C) Resp 18 Ht 5' 8\" (1.727 m) Wt 77.1 kg (170 lb) SpO2 94% BMI 25.85 kg/m² Tmax/24hrs: Temp (24hrs), Av °F (36.7 °C), Min:97.5 °F (36.4 °C), Max:98.3 °F (36.8 °C) Intake/Output Summary (Last 24 hours) at 2019 1018 Last data filed at 2019 6340 Gross per 24 hour Intake 2004.92 ml Output 1100 ml Net 904.92 ml General:  In NAD. Cardiovascular: RRR. Pulmonary:  Clear, no wheezes. Effort nonlabored. GI:  Abdomen soft, NTTP. Extremities:  Warm, no edema or ischemia. Neuro:  Awake and alert, motor nonfocal. 
 
Labs:   
Recent Results (from the past 24 hour(s)) ECHO ADULT COMPLETE Collection Time: 19  3:07 PM  
Result Value Ref Range LA Volume 89.41 (A) 18 - 58 mL  
 LV E' Lateral Velocity 9.03 cm/s LV E' Septal Velocity 6.53 cm/s Tapse 1.55 1.5 - 2.0 cm Ao Root D 3.56 cm  
 LVIDd 4.54 4.2 - 5.9 cm  
 LVPWd 0.96 0.6 - 1.0 cm LVIDs 3.33 cm  IVSd 0.89 0.6 - 1.0 cm  
 LV ED Vol A2C 93.8 mL  
 LV ES Vol A4C 55.5 mL  
 LV ES Vol BP 55.1 22 - 58 mL  
 LVOT d 1.97 cm  
 LVOT Peak Velocity 62.37 cm/s LVOT Peak Gradient 1.6 mmHg LVOT VTI 11.95 cm  
 MV A Andrew 20.69 cm/s  
 MV E Andrew 0.50 cm/s  
 MV E/A 0.02   
 BP EF 38.0 (A) 55 - 100 % LV Ejection Fraction MOD 4C 33 % LV Ejection Fraction MOD 2C 43 % LA Vol 4C 71.63 (A) 18 - 58 mL  
 LA Vol 2C 82.54 (A) 18 - 58 mL  
 LA Area 4C 23.7 cm2 LV Mass .4 88 - 224 g LV Mass AL Index 84.1 49 - 115 g/m2 E/E' lateral 0.06   
 E/E' septal 0.08   
 E/E' ratio (averaged) 0.07 LV ES Vol A2C 53.6 mL  
 LVES Vol Index BP 28.9 mL/m2 LV ED Vol A4C 83.4 mL  
 LVED Vol Index BP 46.6 mL/m2 Mitral Valve E Wave Deceleration Time 154.4 ms Triscuspid Valve Regurgitation Peak Gradient 27.7 mmHg Aortic Regurgitant Pressure Half-time 502.5 cm LV ED Vol BP 88.9 67 - 155 ml  
 TR Max Velocity 263.15 cm/s  
 LA Vol Index 46.87 16 - 28 ml/m2 PASP 31.0 mmHg LA Vol Index 43.27 16 - 28 ml/m2 LA Vol Index 37.55 16 - 28 ml/m2 LVED Vol Index A4C 43.7 mL/m2 LVED Vol Index A2C 49.2 mL/m2 LVES Vol Index A4C 29.1 mL/m2 LVES Vol Index A2C 28.1 mL/m2 AR Max Andrew 405.87 cm/s PTT Collection Time: 02/06/19  4:20 PM  
Result Value Ref Range aPTT 116.9 (H) 23.0 - 36.4 SEC  
PTT Collection Time: 02/06/19 10:32 PM  
Result Value Ref Range aPTT 88.9 (H) 23.0 - 36.4 SEC  
PTT Collection Time: 02/07/19  5:33 AM  
Result Value Ref Range aPTT 81.9 (H) 23.0 - 36.4 SEC Signed By: Tj Martínez MD   
 February 7, 2019 10:18 AM

## 2019-02-07 NOTE — ROUTINE PROCESS
4280 assumed care of pt after bedside verbal report was given by off going nurse, pt resting in bed awake, heparin gtt infusing at 11 units/kg/hr, no acute distress noted, will monitor for changes

## 2019-02-07 NOTE — PROGRESS NOTES
0018. Lolita koehler hospitalist d/t pt requesting sleep aid. 2189. Lolita koehler MD again 5942. ..spoke to Dr. Ascencion Burris, and he wants melatonin 1 mg once.

## 2019-02-07 NOTE — PROGRESS NOTES
[facility] - Progress Note Admit Date: 2/5/2019 Assessment:  
 
Hospital Problems  Date Reviewed: 11/12/2018 Codes Class Noted POA Atrial flutter with rapid ventricular response [facility]) ICD-10-CM: I48.92 
ICD-9-CM: 427.32  2/5/2019 Unknown  
   
  
 
 
 
-Atrial flutter 2:1 block, new diagnosis at PCP office with underlying LBBB. Converted to NSR on amio drip. 
-CMY with EF 38% with diffuse hypokinesis on echo this admission (EF 57% by nuclear stress 5/2017, EF 50% by echo 2009). -Indeterminate troponin, suspect secondary demand ischemia in setting of tachycardia, ECG with chronic LBBB. -Recent GI illness with nausea, vomiting, diarrhea. -CAD history of multivessel stenting in 2009 with a drug-eluting stent to his mid LAD and Tirso Rdz had a bare-metal stent to his proximal RCA.  He has been maintained on aspirin and Plavix since that time. He also remains on simvastatin.  He was unable to tolerate a beta blocker due to to bradycardia. He last underwent a pharmacological nuclear stress test in May 2017 which did not show any significant ischemia. Echo with EF 50% with mild apical hypokinesis 2009. -Hypertension. Stable. -Dyslipidemia. On statin. 
-Chronic left bundle branch block. 
-History of CVA. 
  
Primary Cardiologist Dr. Cynthia Aldana Plan:  
 
Echo with left ventricular moderately decreased systolic function. Calculated left ventricular ejection fraction is 38%. Severe (grade 3) left ventricular diastolic dysfunction. Historically low normal. Possibly tachycardia induced. But also with known CAD. May need to consider repeat ischemic evaluation. Will discuss further with team. 
Have discussed [facility] with patient he wishes to continue his long term ASA/plavix but is willing to consider [facility] as recommended. Currently on heparin drip until further decisions regarding ischemic evaluation are discussed.  
Patient converted to NSR on amio drip, unlikely able to tolerate BB given hitory of resting bradycardia. Will start oral amiodarone in hopes to keep in NSR. Continue electrolyte replacement as needed. Subjective:  
 
Sinus HR about 60 on tele. No SOB. No CP. Objective:  
  
Patient Vitals for the past 8 hrs: 
 Temp Pulse Resp BP SpO2  
02/07/19 0730 98.3 °F (36.8 °C) 63 18 144/79 94 % 02/07/19 0350 98.3 °F (36.8 °C) 62 16 111/63 95 % Patient Vitals for the past 96 hrs: 
 Weight  
02/06/19 1242 77.1 kg (170 lb) 02/05/19 1750 77.1 kg (170 lb) Intake/Output Summary (Last 24 hours) at 2/7/2019 1450 Last data filed at 2/7/2019 6380 Gross per 24 hour Intake 2016.52 ml Output 1325 ml Net 691.52 ml Physical Exam: 
General:  alert, cooperative, no distress, appears stated age Neck:  no JVD Lungs:  clear to auscultation bilaterally Heart:  regular rate and rhythm Abdomen:  abdomen is soft without significant tenderness, masses, organomegaly or guarding Extremities:  extremities normal, atraumatic, no cyanosis or edema Data Review:  
 
Labs: Results:  
   
Chemistry Recent Labs 02/06/19 
0349 02/05/19 
1828 * 117*  141  
K 3.4* 3.7 * 108 CO2 25 26 BUN 19* 24* CREA 1.09 1.36* CA 7.7* 8.4* MG  --  2.3 AGAP 6 7 BUCR 17 18 AP 50 56  
TP 6.0* 7.0 ALB 3.4 4.1 GLOB 2.6 2.9 AGRAT 1.3 1.4  
  
CBC w/Diff Recent Labs 02/06/19 
0349 02/05/19 
2130 02/05/19 
1828 WBC 9.2 8.3 9.8  
RBC 4.57* 4.74 4.92  
HGB 14.2 14.2 15.2 HCT 39.4 40.8 42.7  202 221 GRANS 54 55 61 LYMPH 34 33 28 EOS 5 3 2 Cardiac Enzymes No results found for: CPK, CK, CKMMB, CKMB, RCK3, CKMBT, CKNDX, CKND1, BYRON, TROPT, TROIQ, MAIRA, TROPT, TNIPOC, BNP, BNPP Coagulation Recent Labs 02/07/19 
0533 02/06/19 
2232 APTT 81.9* 88.9* Lipid Panel Lab Results Component Value Date/Time  Cholesterol, total 150 09/08/2010 12:00 PM  
 HDL Cholesterol 56 09/08/2010 12:00 PM  
 LDL, calculated 78 09/08/2010 12:00 PM  
 VLDL, calculated 16 09/08/2010 12:00 PM  
 Triglyceride 80 09/08/2010 12:00 PM  
 CHOL/HDL Ratio 2.7 09/08/2010 12:00 PM  
  
BNP No results found for: BNP, BNPP, XBNPT Liver Enzymes Recent Labs 02/06/19 
9594 TP 6.0* ALB 3.4 AP 50 SGOT 33 Digoxin Thyroid Studies Lab Results Component Value Date/Time TSH 5.92 (H) 02/06/2019 12:39 AM  
    
 
Signed By: AYALA Oconnell February 7, 2019

## 2019-02-07 NOTE — PROGRESS NOTES
Bedside and Verbal shift change report given to Yasmeen Love RN (oncoming nurse) by Lonnie Lowery RN (offgoing nurse). Report included the following information SBAR, Kardex and Intake/Output.

## 2019-02-07 NOTE — PROGRESS NOTES
6072. Luis Daily Luis Daily paged cardiology d/t HR in 50s Amiodarone stopped. 0715. ..spoke with AYALA Guerrero to advise of HR in 50s, and Amiodarone has been stopped. Pt is in a sinus rhythm

## 2019-02-07 NOTE — PROGRESS NOTES
Reason for Admission:  Atrial flutter with rapid ventricular response ([facility] [address] [I48.92] RRAT Score:    10 Plan for utilizing home health:    No   
                 
Likelihood of Readmission:   LOW Transition of Care Plan:         
 
 
Initial assessment completed with patient. Cognitive status of patient: oriented to time, place, person and situation. Face sheet information confirmed:  yes. The patient designates wife, Jennifer Ortiz (cell- [phone]) to participate in his/her discharge plan and to receive any needed information. This patient lives in a single family home with patient and wife. Patient is able to navigate steps as needed. Prior to hospitalization, patient was considered to be independent with ADLs/IADLS : yes . The patient lives in a 2 story home w/ 1 step to enter. His bedroom in on the 2nd floor. He was independent, driving, golfing, and working 3 days per week. Patient has a current ACP document on file: no The patient and wife will be available to transport patient home upon discharge. The patient already has a CPAP medical equipment available in the home. Patient is not currently active with home health. Patient has not stayed in a skilled nursing facility or rehab. This patient is on dialysis :no 
 
 Currently, the discharge plan is Home. The patient states that he can obtain his medications from the pharmacy, and take his medications as directed. Patient's current insurance is: [employer] Care Management Interventions PCP Verified by CM: Yes 
Palliative Care Criteria Met (RRAT>21 & CHF Dx)?: No 
Mode of Transport at Discharge: Self Transition of Care Consult (CM Consult): Discharge Planning Discharge Durable Medical Equipment: No 
Physical Therapy Consult: No 
Occupational Therapy Consult: No 
Speech Therapy Consult: No 
Current Support Network: Lives with Spouse, Own Home Confirm Follow Up Transport: Self Plan discussed with Pt/Family/Caregiver: Yes Discharge Location Discharge Placement: Home Lalito Romano RN 
[phone]

## 2019-02-08 NOTE — PROGRESS NOTES
D/C order noted for today. No needs identified at this time. CM remains available. Family to transport. Paty Romero, [phone]

## 2019-02-08 NOTE — DISCHARGE INSTRUCTIONS
Patient Education        Atrial Fibrillation: Care Instructions  Your Care Instructions    Atrial fibrillation is an irregular and often fast heartbeat. Treating this condition is important for several reasons. It can cause blood clots, which can travel from your heart to your brain and cause a stroke. If you have a fast heartbeat, you may feel lightheaded, dizzy, and weak. An irregular heartbeat can also increase your risk for heart failure. Atrial fibrillation is often the result of another heart condition, such as high blood pressure or coronary artery disease. Making changes to improve your heart condition will help you stay healthy and active. Follow-up care is a key part of your treatment and safety. Be sure to make and go to all appointments, and call your doctor if you are having problems. It's also a good idea to know your test results and keep a list of the medicines you take. How can you care for yourself at home? Medicines    · Take your medicines exactly as prescribed. Call your doctor if you think you are having a problem with your medicine. You will get more details on the specific medicines your doctor prescribes.     · If your doctor has given you a blood thinner to prevent a stroke, be sure you get instructions about how to take your medicine safely. Blood thinners can cause serious bleeding problems.     · Do not take any vitamins, over-the-counter drugs, or herbal products without talking to your doctor first.    Lifestyle changes    · Do not smoke. Smoking can increase your chance of a stroke and heart attack. If you need help quitting, talk to your doctor about stop-smoking programs and medicines. These can increase your chances of quitting for good.     · Eat a heart-healthy diet.     · Stay at a healthy weight. Lose weight if you need to.     · Limit alcohol to 2 drinks a day for men and 1 drink a day for women. Too much alcohol can cause health problems.     · Avoid colds and flu.  Get a pneumococcal vaccine shot. If you have had one before, ask your doctor whether you need another dose. Get a flu shot every year. If you must be around people with colds or flu, wash your hands often. Activity    · If your doctor recommends it, get more exercise. Walking is a good choice. Bit by bit, increase the amount you walk every day. Try for at least 30 minutes on most days of the week. You also may want to swim, bike, or do other activities. Your doctor may suggest that you join a cardiac rehabilitation program so that you can have help increasing your physical activity safely.     · Start light exercise if your doctor says it is okay. Even a small amount will help you get stronger, have more energy, and manage stress. Walking is an easy way to get exercise. Start out by walking a little more than you did in the hospital. Gradually increase the amount you walk.     · When you exercise, watch for signs that your heart is working too hard. You are pushing too hard if you cannot talk while you are exercising. If you become short of breath or dizzy or have chest pain, sit down and rest immediately.     · Check your pulse regularly. Place two fingers on the artery at the palm side of your wrist, in line with your thumb. If your heartbeat seems uneven or fast, talk to your doctor. When should you call for help? Call 911 anytime you think you may need emergency care. For example, call if:    · You have symptoms of a heart attack. These may include:  ? Chest pain or pressure, or a strange feeling in the chest.  ? Sweating. ? Shortness of breath. ? Nausea or vomiting. ? Pain, pressure, or a strange feeling in the back, neck, jaw, or upper belly or in one or both shoulders or arms. ? Lightheadedness or sudden weakness. ? A fast or irregular heartbeat. After you call 911, the  may tell you to chew 1 adult-strength or 2 to 4 low-dose aspirin. Wait for an ambulance.  Do not try to drive yourself.     · You have symptoms of a stroke. These may include:  ? Sudden numbness, tingling, weakness, or loss of movement in your face, arm, or leg, especially on only one side of your body. ? Sudden vision changes. ? Sudden trouble speaking. ? Sudden confusion or trouble understanding simple statements. ? Sudden problems with walking or balance. ? A sudden, severe headache that is different from past headaches.     · You passed out (lost consciousness).    Call your doctor now or seek immediate medical care if:    · You have new or increased shortness of breath.     · You feel dizzy or lightheaded, or you feel like you may faint.     · Your heart rate becomes irregular.     · You can feel your heart flutter in your chest or skip heartbeats. Tell your doctor if these symptoms are new or worse.    Watch closely for changes in your health, and be sure to contact your doctor if you have any problems. Where can you learn more? Go to http://radha-sarah.info/. Enter U020 in the search box to learn more about \"Atrial Fibrillation: Care Instructions. \"  Current as of: July 22, 2018  Content Version: 11.9  © 9984-6111 [employer]. Care instructions adapted under license by [employer] (which disclaims liability or warranty for this information). If you have questions about a medical condition or this instruction, always ask your healthcare professional. [address] any warranty or liability for your use of this information. Patient Education        Learning About Atrial Fibrillation  What is atrial fibrillation? Atrial fibrillation (say \"AY-tree-hao ydg-kqdu-ORB-shun\") is the most common type of irregular heartbeat (arrhythmia). Normally, the heart beats in a strong, steady rhythm. In atrial fibrillation, a problem with the heart's electrical system causes the two upper parts of the heart (the atria) to quiver, or fibrillate.  Your heart rate also may be faster than normal.  Atrial fibrillation can be dangerous because if the heartbeat isn't strong and steady, blood can collect, or pool, in the atria. And pooled blood is more likely to form clots. Clots can travel to the brain, block blood flow, and cause a stroke. Atrial fibrillation can also lead to heart failure. Treatment for atrial fibrillation helps prevent stroke and heart failure. It also helps relieve symptoms. Atrial fibrillation is often caused by another heart problem. It may happen after heart surgery. It may also be caused by other problems, such as an overactive thyroid gland or lung disease. Many people with atrial fibrillation are able to live full and active lives. What are the symptoms? Some people feel symptoms when they have episodes of atrial fibrillation. But other people don't notice any symptoms. If you have symptoms, you may feel:  · A fluttering, racing, or pounding feeling in your chest called palpitations. · Weak or tired. · Dizzy or lightheaded. · Short of breath. · Chest pain. · Confused. You may notice signs of atrial fibrillation when you check your pulse. Your pulse may seem uneven or fast.  What can you expect when you have atrial fibrillation? At first, spells of atrial fibrillation may come on suddenly and last a short time. It may go away on its own or it goes away after treatment. This is called paroxysmal atrial fibrillation. Over time, the spells may last longer and occur more often. They often don't go away on their own. How is it treated? Treatments can help you feel better and prevent future problems, especially stroke and heart failure. The main types of treatment slow the heart rate, control the heart rhythm, and help prevent stroke. Your treatment will depend on the cause of your atrial fibrillation, your symptoms, and your risk for stroke. · Heart rate treatment. Medicine may be used to slow your heart rate. Your heartbeat may still be irregular. But these medicines keep your heart from beating too fast. They may also help relieve your symptoms. · Heart rhythm treatment. Different treatments may be used to try to stop atrial fibrillation and keep it from returning. They can also relieve symptoms. These treatments include medicine, electrical cardioversion to shock the heart back to a normal rhythm, a procedure called catheter ablation, and heart surgery. · Stroke prevention. You and your doctor can decide how to lower your risk. You may decide to take a blood-thinning medicine, such as aspirin or an anticoagulant. How can you live well with it? You can live well and help manage atrial fibrillation by having a heart-healthy lifestyle. To have a heart-healthy lifestyle:  · Don't smoke. · Eat heart-healthy foods. · Be active. Talk to your doctor about what type and level of exercise is safe for you. · Stay at a healthy weight. Lose weight if you need to. · Manage stress. · Avoid alcohol if it triggers symptoms. · Manage other health problems such as high blood pressure, high cholesterol, and diabetes. · Avoid getting sick from the flu. Get a flu shot every year. Where can you learn more? Go to http://radha-sarah.info/. Enter [phone] in the search box to learn more about \"Learning About Atrial Fibrillation. \"  Current as of: July 22, 2018  Content Version: 11.9  © 2337-8356 [employer]. Care instructions adapted under license by [employer] (which disclaims liability or warranty for this information). If you have questions about a medical condition or this instruction, always ask your healthcare professional. [place] [address] any warranty or liability for your use of this information.

## 2019-02-08 NOTE — PROGRESS NOTES
I have reviewed discharge instructions with the patient and spouse. The patient and spouse verbalized understanding. Discharge medications reviewed with patient and spouse and appropriate educational materials and side effects teaching were provided. Current Discharge Medication List  
  
START taking these medications Details  
amiodarone (CORDARONE) 200 mg tablet Take 1 Tab by mouth daily. Qty: 30 Tab, Refills: 0  
  
rivaroxaban (XARELTO) 20 mg tab tablet Take 1 Tab by mouth every twenty-four (24) hours. Qty: 30 Tab, Refills: 0 CONTINUE these medications which have NOT CHANGED Details  
nitroglycerin (NITROSTAT) 0.4 mg SL tablet DISSOLVE 1 TABLET UNDER THE TONGUE  EVERY 5 MINUTES AS NEEDED FOR CHEST PAIN Qty: 3 Bottle, Refills: 3  
  
lisinopril (PRINIVIL, ZESTRIL) 5 mg tablet TAKE 1 TABLET DAILY Qty: 90 Tab, Refills: 3  
  
traMADol (ULTRAM) 50 mg tablet Take 50 mg by mouth every six (6) hours as needed for Pain. UBIDECARENONE/VITAMIN E MIXED (COQ10  PO) Take  by mouth daily. simvastatin (ZOCOR) 20 mg tablet Take 20 mg by mouth nightly. omega-3 acid ethyl esters (LOVAZA) 1 gram capsule Take 2 g by mouth two (2) times a day. clonazepam (KLONOPIN) 1 mg tablet Take 0.5 mg by mouth daily. STOP taking these medications  
  
 clopidogrel (PLAVIX) 75 mg tab Comments:  
Reason for Stopping:   
   
 TURMERIC PO Comments:  
Reason for Stopping: VITAMIN B COMPLEX (B-100 COMPLEX PO) Comments:  
Reason for Stopping:

## 2019-02-08 NOTE — PROGRESS NOTES
[facility] - Progress Note Admit Date: 2/5/2019 Assessment:  
 
Hospital Problems  Date Reviewed: 11/12/2018 Codes Class Noted POA Atrial flutter with rapid ventricular response [facility]) ICD-10-CM: I48.92 
ICD-9-CM: 427.32  2/5/2019 Unknown  
   
  
 
 
 
  
  
-Atrial flutter 2:1 block, new diagnosis at PCP office with underlying LBBB. Converted to NSR on amio drip. 
-CMY with EF 38% with diffuse hypokinesis on echo this admission (EF 57% by nuclear stress 5/2017, EF 50% by echo 2009). -Indeterminate troponin, suspect secondary demand ischemia in setting of tachycardia, ECG with chronic LBBB. -Recent GI illness with nausea, vomiting, diarrhea. -CAD history of multivessel stenting in 2009 with a drug-eluting stent to his mid LAD and Salvatore Cruz had a bare-metal stent to his proximal RCA.  He has been maintained on aspirin and Plavix since that time. He also remains on simvastatin.  He was unable to tolerate a beta blocker due to to bradycardia. He last underwent a pharmacological nuclear stress test in May 2017 which did not show any significant ischemia. Echo with EF 50% with mild apical hypokinesis 2009. -Hypertension. Stable. -Dyslipidemia. On statin. 
-Chronic left bundle branch block. 
-History of CVA. 
  
Primary Cardiologist Dr. Nery Salmeron Plan: Will proceed with nuclear stress test this AM. Currently continued on heparin infusion, along with home ASA and plavix, [place] [facility] [place]. Continued on amiodarone. Continued on lisinopril. Not on BB given sinus bradycardia. Subjective: No CP. No SOB Objective:  
  
Patient Vitals for the past 8 hrs: 
 Temp Pulse Resp BP SpO2  
02/08/19 0732 98.2 °F (36.8 °C) 66 18 153/71 95 % 02/08/19 0400 98.2 °F (36.8 °C) 61 18 138/76 97 % Patient Vitals for the past 96 hrs: 
 Weight  
02/08/19 0400 78.2 kg (172 lb 4.8 oz) 02/06/19 1242 77.1 kg (170 lb) 02/05/19 1750 77.1 kg (170 lb) Intake/Output Summary (Last 24 hours) at 2/8/2019 0848 Last data filed at 2/8/2019 7214 Gross per 24 hour Intake 1080 ml Output 1475 ml Net -395 ml Physical Exam: 
General:  alert, cooperative, no distress, appears stated age Neck:  no JVD Lungs:  clear to auscultation bilaterally Heart:  regular rate and rhythm Abdomen:  abdomen is soft without significant tenderness, masses, organomegaly or guarding Extremities:  extremities normal, atraumatic, no cyanosis or edema Data Review:  
 
Labs: Results:  
   
Chemistry Recent Labs 02/08/19 
0533 02/06/19 
5492 02/05/19 
1828 GLU 99 122* 117*  141 141  
K 3.6 3.4* 3.7 * 110* 108 CO2 24 25 26 BUN 15 19* 24* CREA 1.08 1.09 1.36* CA 8.5 7.7* 8.4* MG 2.1  --  2.3 AGAP 7 6 7 BUCR 14 17 18 AP  --  50 56 TP  --  6.0* 7.0 ALB  --  3.4 4.1 GLOB  --  2.6 2.9 AGRAT  --  1.3 1.4  
  
CBC w/Diff Recent Labs 02/08/19 
6996 02/06/19 
8151 02/05/19 
2130 02/05/19 
1828 WBC 7.7 9.2 8.3 9.8  
RBC 4.25* 4.57* 4.74 4.92  
HGB 12.8* 14.2 14.2 15.2 HCT 36.3 39.4 40.8 42.7  193 202 221 GRANS  --  54 55 61 LYMPH  --  34 33 28 EOS  --  5 3 2 Cardiac Enzymes No results found for: CPK, CK, CKMMB, CKMB, RCK3, CKMBT, CKNDX, CKND1, BYRON, TROPT, TROIQ, MAIRA, TROPT, TNIPOC, BNP, BNPP Coagulation Recent Labs 02/08/19 
0533 02/07/19 
1530 APTT 63.8* 80.8* Lipid Panel Lab Results Component Value Date/Time Cholesterol, total 150 09/08/2010 12:00 PM  
 HDL Cholesterol 56 09/08/2010 12:00 PM  
 LDL, calculated 78 09/08/2010 12:00 PM  
 VLDL, calculated 16 09/08/2010 12:00 PM  
 Triglyceride 80 09/08/2010 12:00 PM  
 CHOL/HDL Ratio 2.7 09/08/2010 12:00 PM  
  
BNP No results found for: BNP, BNPP, XBNPT Liver Enzymes Recent Labs 02/06/19 
6058 TP 6.0* ALB 3.4 AP 50 SGOT 33 Digoxin Thyroid Studies Lab Results Component Value Date/Time TSH 5.92 (H) 02/06/2019 12:39 AM  
    
 
Signed By: AYALA Huynh February 8, 2019

## 2019-02-08 NOTE — PROGRESS NOTES
Problem: Falls - Risk of 
Goal: *Absence of Falls Document Ayad Wanford Fall Risk and appropriate interventions in the flowsheet. Outcome: Progressing Towards Goal 
Fall Risk Interventions: 
  
 
  
 
Medication Interventions: Assess postural VS orthostatic hypotension, Bed/chair exit alarm, Evaluate medications/consider consulting pharmacy, Patient to call before getting OOB, Teach patient to arise slowly Elimination Interventions: Bed/chair exit alarm, Call light in reach, Patient to call for help with toileting needs, Toileting schedule/hourly rounds, Toilet paper/wipes in reach, Urinal in reach

## 2019-02-08 NOTE — PROGRESS NOTES
Patient was injected with 10.71 milliCuries 99mTc Sestamibi for resting images. Patient was injected with 33.0 milliCuries 99mTc Sestamibi for stress images. Injected with 0.4mg Lexiscan. Patient armband left on and sent back to room

## 2019-02-08 NOTE — PROGRESS NOTES
[facility] Hospitalist Group Progress Note Patient: Kaci Gaspar. Age: 70 y.o. : 1940 MR#: [id number] SSN: [id number] Date/Time: 2019 8:50 AM 
 
Subjective/24-hour events:  
 
Preparing to leave floor for nuclear med. Night uneventful. No acute chest pain or SOB. Assessment:  
Atrial flutter with RVR and 2:1 block, now NSR Cardiomyopathy, EF 38% via echo HTN Dyslipidemia CAD s/p stents Cerebrovascular disease with history of CVA Plan: 
Stress test today. IV heparin/ ASA/Plavix/lisinopril/omega-3. [address] therapy going forward TBD. Further plan depending NST result and clinical course with above management. Follow. Case discussed with:  [x]Patient  []Family  []Nursing  []Case Management DVT Prophylaxis:  []Lovenox  []Hep SQ  []SCDs  []Coumadin   [x]On Heparin gtt Objective:  
VS:  
Visit Vitals /71 Pulse 66 Temp 98.2 °F (36.8 °C) Resp 18 Ht 5' 8\" (1.727 m) Wt 78.2 kg (172 lb 4.8 oz) SpO2 95% BMI 26.20 kg/m² Tmax/24hrs: Temp (24hrs), Av.2 °F (36.8 °C), Min:98.2 °F (36.8 °C), Max:98.3 °F (36.8 °C) Intake/Output Summary (Last 24 hours) at 2019 1736 Last data filed at 2019 8877 Gross per 24 hour Intake 1080 ml Output 1475 ml Net -395 ml General:  In NAD. Cardiovascular: RRR. Pulmonary:  Clear, no wheezes. Effort nonlabored. GI:  Abdomen soft, NTTP. Extremities:  Warm, no edema or ischemia. Neuro:  Awake and alert, motor nonfocal. 
 
Labs:   
Recent Results (from the past 24 hour(s)) PTT Collection Time: 19 11:37 AM  
Result Value Ref Range aPTT 79.0 (H) 23.0 - 36.4 SEC T4, FREE Collection Time: 19 11:37 AM  
Result Value Ref Range T4, Free 1.1 0.7 - 1.5 NG/DL  
PTT Collection Time: 19  3:30 PM  
Result Value Ref Range aPTT 80.8 (H) 23.0 - 36.4 SEC  
PTT Collection Time: 19  5:33 AM  
Result Value Ref Range aPTT 63.8 (H) 23.0 - 36.4 SEC  
CBC W/O DIFF Collection Time: 02/08/19  5:33 AM  
Result Value Ref Range WBC 7.7 4.6 - 13.2 K/uL  
 RBC 4.25 (L) 4.70 - 5.50 M/uL  
 HGB 12.8 (L) 13.0 - 16.0 g/dL HCT 36.3 36.0 - 48.0 % MCV 85.4 74.0 - 97.0 FL  
 MCH 30.1 24.0 - 34.0 PG  
 MCHC 35.3 31.0 - 37.0 g/dL  
 RDW 12.8 11.6 - 14.5 % PLATELET 047 386 - 593 K/uL MPV 10.4 9.2 - 84.0 FL  
METABOLIC PANEL, BASIC Collection Time: 02/08/19  5:33 AM  
Result Value Ref Range Sodium 142 136 - 145 mmol/L Potassium 3.6 3.5 - 5.5 mmol/L Chloride 111 (H) 100 - 108 mmol/L  
 CO2 24 21 - 32 mmol/L Anion gap 7 3.0 - 18 mmol/L Glucose 99 74 - 99 mg/dL BUN 15 7.0 - 18 MG/DL Creatinine 1.08 0.6 - 1.3 MG/DL  
 BUN/Creatinine ratio 14 12 - 20 GFR est AA >60 >60 ml/min/1.73m2 GFR est non-AA >60 >60 ml/min/1.73m2 Calcium 8.5 8.5 - 10.1 MG/DL MAGNESIUM Collection Time: 02/08/19  5:33 AM  
Result Value Ref Range Magnesium 2.1 1.6 - 2.6 mg/dL Signed By: Taras Vargas MD   
 February 8, 2019 8:50 AM

## 2019-02-08 NOTE — PROGRESS NOTES
1946 Bedside and Verbal shift change report given to Esvin Tan (oncoming nurse) by Tawanda CANDELARIA (offgoing nurse). Report included the following information SBAR, Kardex, Intake/Output and MAR.

## 2019-02-08 NOTE — ROUTINE PROCESS
1930-Bedside and verbal report from Sims Moritz, RN. Pt alert and oriented times four, no c/o pain, no SOB, assisted to RR by myself, steady gait observed. Pt wants to know when his stress test is tomorrow. Will ask nurse sup. 2030-Pt resting, NAD, no apparent pain, no SOB. Call bell within reach. Still awaiting to heart from nurse sup about scheduled tests. 2130-Pt resting, NAD, no SOB, no chest pain, heparin running therapeutic at this time, next check in AM. Nurse supervisor, Penny Bustamante, informed me pt is not scheduled for nuclear stress test, may be add on. Explained to pt, pt understands but is upset. Wants to go home. 2230-Pt resting, NAD, no apparent pain, no SOB, no chest pain. Call bell within reach. NSR 
 
0000-Pt sleeping, NAD, NSR. Vitals stable, no chest pain, no SOB. Pt is NPO from now just in case he might have his nuclear stress test tomorrow. Pt aware. 0200-Pt sleeping, NSR, no apparent pain, no apparent SOB. 0400-Pt sleeping, NAD, no apparent pain, no SOB, call bell within reach. NSR.  
 
0600-Pt sleeping, NAD, no apparent pain, no SOB, call bell within reach.  NSR.  
 
0700-Bedside and verbal report given to Brita Kocher, RN

## 2019-02-19 NOTE — PROGRESS NOTES
Alton Matos. presents today for a post-hospital follow-up. He was hospitalized on 2/5/19. He was diagnosed with new onset Atrial flutter at his PCP's office. He presented to the hospital with a rapid heart rate. His troponins were indeterminate likely secondary to demand ischemia in the setting of tachycardia. He was noted to be in atrial flutter and converted back to sinus rhythm after being placed on an amiodarone drip. His heart rate was unresponsive to IV cardizem and to confirm atrial flutter, he was given a dose of Adenosine 6mg IV which showed the sawtooth pattern of Aflutter. He was not placed on a beta blocker due to bradycardia. If atrial flutter returns, ablation was recommended. He had an echo done during his hospitalization and it showed an EF of 38%, diffuse hypokinesis, and grade 3 diastolic dysfunction. He also had a pharmacologic nuclear stress test done and it showed normal LV perfusion and no transient ischemic dilatation. EF was 51%. Mr. Johnson Gu is a 70year old male with a history of hypertension, dyslipidemia, CAD (s/p stent to proximal-mid 70% LAD and proximal/distal stents in RCA), PACs, chronic LBBB, and CVA in 2004 (with mild right-sided paresthesias). He has history of bradycardia on beta blockers. Today, he offers complaints of possible shortness of breath described as feeling like he \"cannot take a deep breath. \"  He has also noticed some insomnia, fatigue, blurred vision, and feeling a little \"on edge. \" He was recently started on Baclofen by his PCP to help with his chronic back pain. Symptom complex seems to be associated with the initiation of amiodarone and/or Baclofen. He reports that he was previously on Tramadol which he states was not given to him during his hospital stay. He is currently not on the medication as he reports that it is no longer going to be prescribed for him by his PCP.     He denies chest pain, tightness, heaviness, and admits to palpitations. He denies shortness of breath at rest but admits to not feeling like he can \"take a deep breath. \"  He denies dyspnea on exertion, orthopnea and PND. He denies abdominal bloating. He denies lightheadedness, admits to mild, occasional dizziness, and denies syncope. He denies lower extremity edema and claudication. Denies nausea, vomiting, diarrhea, fever, chills. PMH:  Past Medical History:   Diagnosis Date    Cardiac catheterization 03/11/2009    LM mild. p/mLAD 70%. mLAD stent patent. oD 85%. CX 30%. mRCA 35%. p/dRCA stents patent. RPLB 30%. Unsuccessful PTCA of diagonal lesion.  Cardiac echocardiogram 03/10/2009    Tech. difficult. EF 50%. Mild apical hypk. Mod increased ventricular septal wall thickness. Mild LAE. Mild-mod AI.  Cardiac Holter monitor 01/28/2015    Baseline bradycardia w/bundle branch block. Max HR of 91 bpm may indicate chronotropic incompetence. Patient's events mainly corresponded to PVCs w/ventricular trigeminy. PVC burden <1% in 48-hour study.  Cardiac nuclear imaging test 08/04/2014    No ischemia or prior infarction. EF 62%. No RWMA. Nondiagnostic EKG on pharm stress test.  Low risk.  Coronary artery disease     MI and stents placed approx 2012    Dyslipidemia     Hypertension     PACs     Sleep apnea     uses cpap    Stroke [facility])     '04  residual R paraesthesias       PSH:  Past Surgical History:   Procedure Laterality Date    COLONOSCOPY N/A 10/18/2016    COLONOSCOPY and polypectomies performed by Sera Angeles MD at [facility] ENDOSCOPY    [address]  2012 (approx)    2 stents    SHOULDER SURG [address] UNLISTED      '99       MEDS:  Current Outpatient Medications   Medication Sig    baclofen (LIORESAL) 10 mg tablet Take 10 mg by mouth two (2) times a day.  rivaroxaban (XARELTO) 20 mg tab tablet Take 1 Tab by mouth every twenty-four (24) hours.     amiodarone (CORDARONE) 200 mg tablet Take 1 Tab by mouth daily.    nitroglycerin (NITROSTAT) 0.4 mg SL tablet DISSOLVE 1 TABLET UNDER THE TONGUE  EVERY 5 MINUTES AS NEEDED FOR CHEST PAIN    lisinopril (PRINIVIL, ZESTRIL) 5 mg tablet TAKE 1 TABLET DAILY    traMADol (ULTRAM) 50 mg tablet Take 50 mg by mouth every six (6) hours as needed for Pain.  UBIDECARENONE/VITAMIN E MIXED (COQ10  PO) Take  by mouth daily.  simvastatin (ZOCOR) 20 mg tablet Take 20 mg by mouth nightly.  aspirin 81 mg tablet Take 81 mg by mouth daily.  omega-3 acid ethyl esters (LOVAZA) 1 gram capsule Take 2 g by mouth two (2) times a day.  clonazepam (KLONOPIN) 1 mg tablet Take 0.5 mg by mouth daily. No current facility-administered medications for this visit. Allergies and Sensitivities:  No Known Allergies    Family History:  Family History   Problem Relation Age of Onset    Heart Failure Mother     Diabetes Mother     Stroke Father     Diabetes Father        Social History:  He  reports that  has never smoked. he has never used smokeless tobacco.  He  reports that he drinks alcohol. Physical:  Visit Vitals  /64   Pulse (!) 52   Ht 5' 8\" (1.727 m)   Wt 78.5 kg (173 lb)   SpO2 98%   BMI 26.30 kg/m²         Exam:  Neck:  Supple, no JVD, no carotid bruits  CV:  Normal S1 and  S2, no murmurs, rubs, or gallops noted  Lungs:  Clear to ausculation throughout, no wheezes or rales  Abd:  Soft, non-tender, non-distended with good bowel sounds.   No hepatosplenomegaly  Extremities:  No edema      Data:  EKG:   Read by Noé Childers MD - Sinus bradycardia.  LBBB.  Leftward axis      LABS:  Lab Results   Component Value Date/Time    Sodium 142 02/08/2019 05:33 AM    Potassium 3.6 02/08/2019 05:33 AM    Chloride 111 (H) 02/08/2019 05:33 AM    CO2 24 02/08/2019 05:33 AM    Glucose 99 02/08/2019 05:33 AM    BUN 15 02/08/2019 05:33 AM    Creatinine 1.08 02/08/2019 05:33 AM     Lab Results   Component Value Date/Time    Cholesterol, total 150 09/08/2010 12:00 PM    HDL Cholesterol 56 09/08/2010 12:00 PM    LDL, calculated 78 09/08/2010 12:00 PM    Triglyceride 80 09/08/2010 12:00 PM    CHOL/HDL Ratio 2.7 09/08/2010 12:00 PM     Lab Results   Component Value Date/Time    ALT (SGPT) 37 02/06/2019 03:49 AM       Impression / Plan:  1. New onset Atrial flutter, currently maintaining sinus rhythm  2. History of PACs  3.  CAD, s/p multivessel stenting in 2009 with CHRISTOS to mid LAD and RPDA as well as BMS to RCA  4. Essential Hypertension, blood pressure well-controlled  5. Dyslipidemia, on simvastatin 20mg  6. History of CVA  7. Chronic left bundle branch block    Mr. Eric Fine was seen today for follow-up after a recent hospitalization for new onset atrial flutter diagnosed at his PCP's office. In the ER, his EKG showed a wide complex tachycardia with heart rate up to the 140's. He was given a bolus of Cardizem and started on a Cardizem drip which he was not responsive to. He was then given a dose of Adenosine 6mg to determine whether the rhythm was a wide complex tachycardia versus Atrial flutter. When his heart rate slowed, the sawtooth pattern was noted and Atrial flutter was confirmed. He was then started on an amiodarone drip and he converted back to sinus rhythm. During his hospital stay, his TSH was noted to be somewhat elevated at 5.92 but free T4 was normal at 1.1. He has history of bradycardia on beta blockers and therefore was discharged on Amiodarone 200mg daily. He also agreed to anticoagulation (which he has declined in the past). He was started on Xarelto 20mg daily. Since being discharged home, he has been feeling okay. He has noticed the following symptoms, possible shortness of breath described as feeling like he \"cannot take a deep breath. \"  He has also noticed some insomnia, fatigue, blurred vision, and feeling a little \"on edge. \"  The above symptoms are noted to be listed in the amiodarone side effect profile.   He also reports being started on Baclofen recently. Symptoms are tolerable and he is willing to continue taking the amiodarone. Follow-up with Dr Maddy Pryor has been scheduled to discuss whether or not he will need to continue amiodarone and other treatment options. He has been scheduled to see Dr Maddy Pryor in 2 weeks. If symptoms worsen or become intolerable, he was instructed to call the office and let us know. In preparation for longer term use of amiodarone, will request that he have PFTs done to obtain baseline lung function. He has follow-up scheduled with Dr. Darrel Reyez in 2 weeks as well. He will follow-up with Dr. Delio Valdovinos as scheduled and as needed. Greater than 50% of a 40 minute visit was spent in discussion, counseling and answering questions. Teaching done today re:  Atrial flutter, medications, ablation. Elizabeth Jackson MSN, FNP-BC    Please note:  Portions of this chart were created with Dragon medical speech to text program.  Unrecognized errors may be present.

## 2019-02-25 NOTE — PROGRESS NOTES
Joline Dubin. presents today for   Chief Complaint   Patient presents with   [facility] Follow Up     follow up        Joline Dubin. preferred language for health care discussion is english/other. Is someone accompanying this pt? wife    Is the patient using any DME equipment during [address]? no    Depression Screening:  No flowsheet data found. Learning Assessment:  Learning Assessment 5/7/2018   PRIMARY LEARNER Patient   PRIMARY LANGUAGE ENGLISH   LEARNER PREFERENCE PRIMARY DEMONSTRATION   ANSWERED BY patient   RELATIONSHIP SELF       Abuse Screening:  No flowsheet data found. Fall Risk  No flowsheet data found. Pt currently taking Anticoagulant therapy? Xarelto    Coordination of Care:  1. Have you been to the ER, urgent care clinic since your last visit? Hospitalized since your last visit? 2/5 - 2/8 for AFib     2. Have you seen or consulted any other health care providers outside of the [address] [place] [address] since your last visit? Include any pap smears or colon screening.  no

## 2019-02-25 NOTE — PATIENT INSTRUCTIONS
Continue present medication regimen for now  Pulmonary function test; Dx: shortness of breath, started amiodarone  Follow-up with Dr Joellen Sandy to discuss amio and treatment options  Follow-up with Dr. Kishan Guillen as scheduled and as needed       Atrial Flutter: Care Instructions  Your Care Instructions    Atrial flutter is a type of heartbeat problem (arrhythmia) that usually causes a fast heart rate. In atrial flutter, a problem with the heart's electrical system causes the two upper parts of the heart (the right atrium and the left atrium) to flutter, or beat very fast. Atrial flutter might be diagnosed using an an electrocardiogram (EKG). An EKG translates the heart's electrical activity into line tracings on paper. Treating atrial flutter is important for several reasons. The change in heartbeat can cause blood clots. The clots can travel from your heart to your brain and cause a stroke. A fast heartbeat can make you feel lightheaded, dizzy, and weak. And over time, it can also increase your risk for heart failure. Atrial flutter is often the result of another heart condition, such as coronary artery disease or some other heart rhythm problems. Making changes to improve your heart health will help you stay healthy and active. Your doctor may prescribe medicines to help slow down your heartbeat. You may also take medicine to help prevent a stroke. In some cases, a procedure called catheter ablation is done to stop atrial flutter. Follow-up care is a key part of your treatment and safety. Be sure to make and go to all appointments, and call your doctor if you are having problems. It's also a good idea to know your test results and keep a list of the medicines you take. How can you care for yourself at home? Medicines    · Take your medicines exactly as prescribed. Call your doctor if you think you are having a problem with your medicine. You will get more details on the specific medicines your doctor prescribes.   · If your doctor has given you a blood thinner to prevent a stroke, be sure you get instructions about how to take your medicine safely. Blood thinners can cause serious bleeding problems.     · Do not take any vitamins, over-the-counter drugs, or herbal products without talking to your doctor first.    Lifestyle changes    · Do not smoke. Smoking can increase your chance of a stroke and heart attack. If you need help quitting, talk to your doctor about stop-smoking programs and medicines. These can increase your chances of quitting for good.     · Eat a heart-healthy diet.     · Stay at a healthy weight. Lose weight if you need to.     · Limit alcohol to 2 drinks a day for men and 1 drink a day for women. Too much alcohol can cause health problems.     · Avoid colds and flu. Get a pneumococcal vaccine shot. If you have had one before, ask your doctor whether you need another dose. Get a flu shot every year. If you must be around people with colds or flu, wash your hands often. Activity    · Talk to your doctor about what type and level of exercise is safe for you. Start light exercise if your doctor says it is okay. Walking is a good choice. Try for at least 30 minutes on most days of the week. You also may want to swim, bike, or do other activities.     · When you exercise, watch for signs that your heart is working too hard. You are pushing too hard if you can't talk while you exercise. If you become short of breath or dizzy or have chest pain, sit down and rest right away. When should you call for help? Call 911 anytime you think you may need emergency care. For example, call if:    · You have symptoms of a stroke. These may include:  ? Sudden numbness, tingling, weakness, or loss of movement in your face, arm, or leg, especially on only one side of your body. ? Sudden vision changes. ? Sudden trouble speaking. ? Sudden confusion or trouble understanding simple statements.   ? Sudden problems with walking or balance. ? A sudden, severe headache that is different from past headaches.     · You passed out (lost consciousness).    Call your doctor now or seek immediate medical care if:    · You have new or increased shortness of breath.     · You feel dizzy or lightheaded, or you feel like you may faint.     · Your heart rate becomes irregular.     · You can feel your heart flutter in your chest or skip heartbeats. Tell your doctor if these symptoms are new or worse.    Watch closely for changes in your health, and be sure to contact your doctor if you have any problems. Where can you learn more? Go to http://radha-sarah.info/. Enter O367 in the search box to learn more about \"Atrial Flutter: Care Instructions. \"  Current as of: July 22, 2018  Content Version: 11.9  © 1749-7263 [employer]. Care instructions adapted under license by [employer] (which disclaims liability or warranty for this information). If you have questions about a medical condition or this instruction, always ask your healthcare professional. [address] any warranty or liability for your use of this information.

## 2019-03-12 NOTE — DISCHARGE SUMMARY
Discharge Summary    Patient: Manny Neal. MRN: [id number]  CSN: [id number]    YOB: 1940  Age: 70 y.o. Sex: male    DOA: 2/5/2019 LOS:  LOS: 3 days   Discharge Date: 2/8/2019     Admission Diagnoses: Atrial flutter with rapid ventricular response ([address] [I48.92]    Discharge Diagnoses:    Atrial flutter with RVR and 2:1 block, now NSR  Cardiomyopathy, EF 38% via echo  Chronic diastolic CHF  HTN  Dyslipidemia   CAD s/p stents  Cerebrovascular disease with history of CVA    Discharge Condition: Stable    PHYSICAL EXAM  Visit Vitals  /80   Pulse 61   Temp 98 °F (36.7 °C)   Resp 20   Ht 5' 8\" (1.727 m)   Wt 78 kg (172 lb)   SpO2 97%   BMI 26.15 kg/m²       General: In NAD. HEENT: NCAT. Sclerae anicteric. Lungs:  CTA Bilaterally. No Wheezing/Rhonchi/Rales. Heart:  RRR. Abdomen: Soft, NTTP. Extremities: No c/c/e  Psych:   Mood normal.  Neurologic:  Awake and alert, motor nonfocal.    Hospital Course:   See admission H&P for full details of HPI. Patient admitted to medical stepdown with cardiology in consultation. He was managed for rapid atrial flutter and has responded well to treatment - patient now in NSR. IV heparin was initiated on admission and continued - patient ultimately transitioned to NOAC therapy prior to discharge. Nuclear stress test was completed with results as documented below. Patient is medically stable for discharge home as no recommendation for further workup/intervention recommended. Consults:   Cardiology    Significant Diagnostic Studies:   NST:  Interpretation Summary     · Baseline ECG: Normal sinus rhythm, left bundle branch block. · Gated SPECT: Left ventricular function post-stress was normal. Calculated ejection fraction is 51%. There is no evidence of transient ischemic dilation (TID). The TID ratio is 0.97.   · Left ventricular perfusion is probably normal.  · Myocardial perfusion imaging defect 1: There is a defect that is small in size present in the apex location(s) that is predominantly fixed. The defect appears to probably be artifact caused by soft tissue. · Myocardial perfusion imaging supports a low risk stress test.             2D echo: Interpretation Summary     · Mild aortic root dilatation. · Right ventricular global systolic function is borderline low. · Right atrial cavity size is mildly dilated. · Left ventricular moderately decreased systolic function. Calculated left ventricular ejection fraction is 38%. Severe (grade 3) left ventricular diastolic dysfunction. · Left atrial cavity size is moderately dilated. · Mild to moderate aortic valve regurgitation is present. · Mitral annular calcification. Trace mitral valve regurgitation. · There is no evidence of pulmonary hypertension. CXR:    IMPRESSION:  No clearly acute findings. Underpenetration slightly limits evaluation. Discharge Me dications:     Discharge Medication List as of 2/8/2019  3:03 PM      START taking these medications    Details   amiodarone (CORDARONE) 200 mg tablet Take 1 Tab by mouth daily. , Print, Disp-30 Tab, R-0      rivaroxaban (XARELTO) 20 mg tab tablet Take 1 Tab by mouth every twenty-four (24) hours. , Print, Disp-30 Tab, R-0         CONTINUE these medications which have NOT CHANGED    Details   nitroglycerin (NITROSTAT) 0.4 mg SL tablet DISSOLVE 1 TABLET UNDER THE TONGUE  EVERY 5 MINUTES AS NEEDED FOR CHEST PAIN, Normal, Disp-3 Bottle, R-3      lisinopril (PRINIVIL, ZESTRIL) 5 mg tablet TAKE 1 TABLET DAILY, Normal, Disp-90 Tab, R-3      traMADol (ULTRAM) 50 mg tablet Take 50 mg by mouth every six (6) hours as needed for Pain., Historical Med      UBIDECARENONE/VITAMIN E MIXED (COQ10  PO) Take  by mouth daily. Historical Med      simvastatin (ZOCOR) 20 mg tablet Take 20 mg by mouth nightly. Historical Med, 20 mg      aspirin 81 mg tablet Take 81 mg by mouth daily. Historical Med, 81 mg      omega-3 acid ethyl esters (LOVAZA) 1 gram capsule Take 2 g by mouth two (2) times a day. Historical Med, 2 g      clonazepam (KLONOPIN) 1 mg tablet Take 0.5 mg by mouth daily. Historical Med, 0.5 mg         STOP taking these medications       TURMERIC PO Comments:   Reason for Stopping:         clopidogrel (PLAVIX) 75 mg tab Comments:   Reason for Stopping:         VITAMIN B COMPLEX (B-100 COMPLEX PO) Comments:   Reason for Stopping:                   Activity: As tolerated    Diet: Cardiac Diet    Disposition:  Home    Follow-up: with PCP, Maribeth Brady MD in 1 week and cardiology as directed. Lloyd Gan.  Karen Mcmahon MD  [facility]

## 2019-03-14 NOTE — PROGRESS NOTES
History of Present Illness:  A 70 y.o. man here for follow up. He was in the hospital February 2019 with atrial flutter rapid response. He had underlying left bundle branch block. He was difficult to convert and ultimately started on Amiodarone drip and then given Amiodarone orally. He was switched from Plavix to Xarelto. He was discharged from the hospital and for about 4 days he was quite tired. He went to see his primary care physician and given Baclofen for some issues with sleep and muscle spasms. Since then he has actually been feeling short of breath at times and also been feeling dizzy and more tired. He is checking his pulse at home and it is in the 50's. It has not been fast and his blood pressure has been stable. He denies bleeding issues. He just had pulmonary function tests and he had a mild obstructive defect, diffusion capacity was normal by report. Impression/Plan:   Recent increase in fatigue, some gait unsteadiness and just overall not feeling well. His heart rate has been in the 50's when he checks it at home. Recent diagnosis of atrial flutter rapid response with initiation of Amiodarone. Hypertension. Dyslipidemia on statin. Chronic Xarelto use. History of coronary artery disease, previous stenting, multiple lesions dating back to 2009. Stress test February 2019 without obvious ischemia. EF was 51%. There was a small fixed apical defect. 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 At this point, he does not appear to be in decompensated heart failure. He had pulmonary function tests with mild obstructive defect only. He went through all medications and many of his side effects could be attributed to his medications. It does not appear to be recurrent atrial flutter. I talked about stopping Amiodarone which actually I recommended but he feels it may be more related to Baclofen. I therefore stated he could stop Baclofen.  If he feels better after 2-3 days, we have an answer. If, however, he is still not feeling well, I would like to stop the Amiodarone long term. I will have him see Dr. Araseli Flanagan in 3-4 weeks and if it recurs, I would definitely pursue ablation. If, however, he continues to have these symptoms, I would consider heart catheterization given his history of significant coronary artery disease as well as repeat echocardiogram. I also discussed the possible potential need for future pacemaker as he has underlying left bundle branch block as well. Total care time spent in reviewing the case, multiple EMR databases, physician notes, procedure notes, examining the patient, and documentation, is 40 minutes.      Discussed in details with patient. All questions were answered to their full satisfaction. Risk, benefit and alternatives were discussed. More than 50% time spent in counseling and coordination of care. Past Medical History:   Diagnosis Date    Cardiac catheterization 03/11/2009    LM mild. p/mLAD 70%. mLAD stent patent. oD 85%. CX 30%. mRCA 35%. p/dRCA stents patent. RPLB 30%. Unsuccessful PTCA of diagonal lesion.  Cardiac echocardiogram 03/10/2009    Tech. difficult. EF 50%. Mild apical hypk. Mod increased ventricular septal wall thickness. Mild LAE. Mild-mod AI.  Cardiac Holter monitor 01/28/2015    Baseline bradycardia w/bundle branch block. Max HR of 91 bpm may indicate chronotropic incompetence. Patient's events mainly corresponded to PVCs w/ventricular trigeminy. PVC burden <1% in 48-hour study.  Cardiac nuclear imaging test 08/04/2014    No ischemia or prior infarction. EF 62%. No RWMA. Nondiagnostic EKG on pharm stress test.  Low risk.     Coronary artery disease     MI and stents placed approx 2012    Dyslipidemia     Hypertension     PACs     Sleep apnea     uses cpap    Stroke [facility])     '04  residual R paraesthesias       Current Outpatient Medications   Medication Sig Dispense Refill    baclofen (LIORESAL) 10 mg tablet Take 10 mg by mouth two (2) times a day.  rivaroxaban (XARELTO) 20 mg tab tablet Take 1 Tab by mouth every twenty-four (24) hours. 90 Tab 3    amiodarone (CORDARONE) 200 mg tablet Take 1 Tab by mouth daily. 30 Tab 1    nitroglycerin (NITROSTAT) 0.4 mg SL tablet DISSOLVE 1 TABLET UNDER THE TONGUE  EVERY 5 MINUTES AS NEEDED FOR CHEST PAIN 3 Bottle 3    lisinopril (PRINIVIL, ZESTRIL) 5 mg tablet TAKE 1 TABLET DAILY 90 Tab 3    UBIDECARENONE/VITAMIN E MIXED (COQ10  PO) Take  by mouth daily.  simvastatin (ZOCOR) 20 mg tablet Take 20 mg by mouth nightly.  aspirin 81 mg tablet Take 81 mg by mouth daily.  omega-3 acid ethyl esters (LOVAZA) 1 gram capsule Take 2 g by mouth two (2) times a day.  clonazepam (KLONOPIN) 1 mg tablet Take 0.5 mg by mouth daily. Social History   reports that  has never smoked. he has never used smokeless tobacco.   reports that he drinks alcohol. Family History  family history includes Diabetes in his father and mother; Heart Failure in his mother; Stroke in his father. Review of Systems  Except as stated above include:  Constitutional: Negative for fever, chills and malaise/fatigue. HEENT: No congestion or recent URI. Gastrointestinal: No nausea, vomiting, abdominal pain, bloody stools. Pulmonary:  Negative except as stated above. Cardiac:  Negative except as stated above. Musculoskeletal: Negative except as stated above. Neurological:  No localized symptoms. Skin:  Negative except as stated above. Psych:  Negative except as stated above. Endocrine:  Negative except as stated above.     PHYSICAL EXAM  BP Readings from Last 3 Encounters:   03/14/19 136/54   02/25/19 142/64   02/08/19 153/80     Pulse Readings from Last 3 Encounters:   03/14/19 (!) 53   02/25/19 (!) 52   02/08/19 61     Wt Readings from Last 3 Encounters:   03/14/19 80.3 kg (177 lb)   02/25/19 78.5 kg (173 lb)   02/08/19 78 kg (172 lb)     General:   Well developed, well groomed. Head/Neck:   No jugular venous distention     No carotid bruits. No evidence of xanthelasma. Lungs:   No respiratory distress. Clear bilaterally. Heart:    Regular rate and rhythm. Normal S1/S2. Palpation of heart with normal point of maximum impulse. No significant murmurs, rubs or gallops. Abdomen:   Soft and nontender. No palpable abdominal mass or bruits. Extremities:   Intact peripheral pulses. No significant edema. Neurological:   Alert and oriented to person, place, time. No focal neurological deficit visually.   Skin:   No obvious rash    Blood Pressure Metric:  Abhijit Rankin has been given the following recommendations today due to his elevated BP reading: monitor

## 2019-03-19 NOTE — TELEPHONE ENCOUNTER
Dr. Brendan Pigeon called a spoke with Dr. Mallory Armijo in absence of Dr. Rick Shi & Dr. Suhail Amin in reference to EKG with sinus bradycardia with left bundle branch block and heart rate of 46. Verbal order and read back per Aide Keith, DO  Hold Amiodarone. 48 hr Holter evaluate for pauses. This has been fully explained to the patient, who indicates understanding. Orders given to .

## 2019-03-28 NOTE — PROGRESS NOTES
PEDRO LUIS ordered under Dr Shelly Hardy, having Skillen review in Dr. Mala Hinojosa and Dr. Cm rollins. Forwarding to you for reference.